# Patient Record
Sex: FEMALE | Race: WHITE | NOT HISPANIC OR LATINO | Employment: PART TIME | URBAN - NONMETROPOLITAN AREA
[De-identification: names, ages, dates, MRNs, and addresses within clinical notes are randomized per-mention and may not be internally consistent; named-entity substitution may affect disease eponyms.]

---
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
## 2022-03-11 NOTE — PROGRESS NOTES
AUDIOMETRIC EVALUATION      Name:  Deborah Barbosa  :  2000  Age:  21 y.o.  Date of Evaluation:  2022       History:  Reason for visit:  Ms. Barbosa is seen today at the request of Casey Matute Jr., MD for a hearing evaluation. Patient was referred to ENT clinic for diminished hearing bilaterally. Patient is here today with her mother. Patient reports having hearing difficulties in both ears. She states this began about two months ago. Patient has not had her hearing tested within the past 5 years ago.     PE Tubes:  yes, both ears, as a child  Other otologic surgical history: yes, both ears, double ear drum repair surgery in 2017  Tinnitus:  yes, both ears  Dizziness:  no  Noise Exposure: yes, hunting (right-handed) wears hearing protection  Aural Fullness:  no, both ears  Otalgia: yes, both ears  Family history of hearing loss: no  Other significant history: high blood pressure, asthma, PCOS       EVALUATION:        RESULTS:    Otoscopic Evaluation:  Right: partially occluding cerumen, tympanic membrane slightly visualized  Left: clear canal, tympanic membrane visualized, possible perforation visualized and scarring on tympanic membrane noted     Tympanometry (226 Hz):  Bilateral: Type B- large ear canal volume    Pure Tone Audiometry:    Right: mild conductive hearing loss rising to normal, with a mild drop at 3kHz  Left: mild conductive hearing loss rising to normal, sloping back to mild     Speech Audiometry:   Right: Speech Reception Threshold (SRT) was obtained at 25 dBHL  Word Recognition scores- excellent or within normal limits (90 - 100%) using NU-6 List 4A, 25 words  Left: Speech Reception Threshold (SRT) was obtained at 20 dBHL  Word Recognition scores- excellent or within normal limits (90 - 100%) using NU-6 List 4A, 25 words    IMPRESSIONS:  Tympanometry showed a large ear canal volume, consistent with a tympanic membrane perforation or a patent PE tube, for both ears. Pure tone  thresholds for both ears show a mild conductive hearing loss, suggesting abnormal outer/middle ear function and normal cochlear/retrocochlear function. Asymmetry present with the right ear significantly worse than the left ear at 3kHz. Patient and mother were counseled with regard to the findings.      Diagnosis:  1. Conductive hearing loss, bilateral    2. Tinnitus of both ears    3. Otalgia of both ears         RECOMMENDATIONS/PLAN:  Follow-up recommendations per Casey Matute Jr., MD    Return for audiologic testing after medical intervention  Use communication strategies  Use hearing protection around loud noises  Avoid silence when possible. Sleep with white noise/fan, or listen to nature sounds      EDUCATION:  Discussed results and recommendations with patient. Questions were addressed and the patient was encouraged to contact our department should concerns arise.        ROSA ELENA Cotto  Licensed Audiologist

## 2022-03-14 ENCOUNTER — OFFICE VISIT (OUTPATIENT)
Dept: OTOLARYNGOLOGY | Facility: CLINIC | Age: 22
End: 2022-03-14

## 2022-03-14 ENCOUNTER — PROCEDURE VISIT (OUTPATIENT)
Dept: OTOLARYNGOLOGY | Facility: CLINIC | Age: 22
End: 2022-03-14

## 2022-03-14 VITALS — HEIGHT: 64 IN | TEMPERATURE: 98.2 F | BODY MASS INDEX: 40.39 KG/M2 | WEIGHT: 236.6 LBS

## 2022-03-14 DIAGNOSIS — H93.13 TINNITUS OF BOTH EARS: ICD-10-CM

## 2022-03-14 DIAGNOSIS — H90.0 CONDUCTIVE HEARING LOSS, BILATERAL: Primary | ICD-10-CM

## 2022-03-14 DIAGNOSIS — H74.03 TYMPANOSCLEROSIS OF BOTH EARS: ICD-10-CM

## 2022-03-14 DIAGNOSIS — H90.6 MIXED CONDUCTIVE AND SENSORINEURAL HEARING LOSS OF BOTH EARS: Primary | ICD-10-CM

## 2022-03-14 DIAGNOSIS — H72.92 MONOMERIC TYMPANIC MEMBRANE OF LEFT EAR: ICD-10-CM

## 2022-03-14 DIAGNOSIS — H92.03 OTALGIA OF BOTH EARS: ICD-10-CM

## 2022-03-14 DIAGNOSIS — H65.493 OTHER CHRONIC NONSUPPURATIVE OTITIS MEDIA OF BOTH EARS: ICD-10-CM

## 2022-03-14 DIAGNOSIS — H61.21 CERUMEN DEBRIS ON TYMPANIC MEMBRANE OF RIGHT EAR: ICD-10-CM

## 2022-03-14 DIAGNOSIS — Z98.890 STATUS POST TYMPANOPLASTY: ICD-10-CM

## 2022-03-14 DIAGNOSIS — H72.91 PERFORATION OF RIGHT TYMPANIC MEMBRANE: ICD-10-CM

## 2022-03-14 PROCEDURE — 99204 OFFICE O/P NEW MOD 45 MIN: CPT | Performed by: OTOLARYNGOLOGY

## 2022-03-14 PROCEDURE — 92567 TYMPANOMETRY: CPT

## 2022-03-14 PROCEDURE — 69210 REMOVE IMPACTED EAR WAX UNI: CPT | Performed by: OTOLARYNGOLOGY

## 2022-03-14 PROCEDURE — 92557 COMPREHENSIVE HEARING TEST: CPT

## 2022-03-14 RX ORDER — CETIRIZINE HYDROCHLORIDE 10 MG/1
10 TABLET ORAL DAILY PRN
COMMUNITY

## 2022-03-14 RX ORDER — LOSARTAN POTASSIUM 50 MG/1
TABLET ORAL DAILY
COMMUNITY
Start: 2022-02-28

## 2022-03-14 RX ORDER — MONTELUKAST SODIUM 10 MG/1
TABLET ORAL DAILY
COMMUNITY
Start: 2022-02-10

## 2022-03-14 RX ORDER — ASCORBIC ACID 250 MG
250 TABLET ORAL DAILY
COMMUNITY
End: 2022-05-10 | Stop reason: ALTCHOICE

## 2022-03-14 RX ORDER — ALBUTEROL SULFATE 90 UG/1
2 AEROSOL, METERED RESPIRATORY (INHALATION) EVERY 4 HOURS PRN
COMMUNITY

## 2022-03-14 NOTE — PATIENT INSTRUCTIONS
PREOPERATIVE SURGERY/PROCEDURE INSTRUCTIONS:  Do not eat or drink ANYTHING after midnight, unless instructed   Clean the operative site by showering with an antibacterial soap (like Dial, Dove, Ivory, etc) and shampooing hair  Preoperative scrub for Surgery:   Skin: Antibacterial soap (Dial, Ivory, Dove) shower daily, including hair.  Be careful not to get into eyes  Do this daily for 5 days  Mouth: Betadine solution 3 times daily for 5 days  Do NOT pluck, shave hair on skin the night prior to operation  If you are diabetic, take your blood sugar the night before and in the morning prior to coming to hospital and give results to nurse and the anesthesiologist    ENT PREOPERATIVE PROTOCOL FOR SURGERY: Begin after COVID test has been performed  After test performed, PLEASE self-quarantine to prevent possible infection!! And start below  Betadine rinses:  Use Betadine rinse in the nose  Spray twice in each nostril 3 times a day beginning 3 days before surgery  Spray nose the morning of surgery, bring with you to the operating room  Use Betadine rinse in the mouth  Gargle, swish and spit 15 ml in mouth and rinse around teeth 3 times daily beginning 3 days prior to surgery  Repeat morning of surgery before arriving at hospital  Betadine rinse can be prescribed at the [facility]    Betadine Iodine mixture Recipe:  Neilmed bottle from pharmacy  Use [employer] Med packet that comes with the bottle  Add Betadine/Povidone 10 ml (2 tsp) to the bottle  Add Melvin baby shampoo 2.5 ml (½ tsp) to the bottle  Fill bottle with distilled water (from grocery store)    DO NOT USE IF IODINE/BETADINE ALLERGIC, OR HISTORY OF THYROID PROBLEMS/THYROID CANCER    Non Betadine rinses:  Nasal rinses:  Use rinse in the nose  Spray twice in each nostril 3 times a day beginning 3 days before surgery  Spray nose the morning of surgery, bring with you to the operating room  Use Same rinse in the mouth  Gargle, swish and spit 15 ml in  mouth and rinse around teeth 3 times daily beginning 3 days prior to surgery  Repeat morning of surgery before arriving at hospital    Nasal mixture Recipe:  Neilmed bottle from pharmacy  Use Juan David Med packet that comes with the bottle  Add Melvin baby shampoo 2.5 ml (½ tsp) to the bottle  Fill bottle with distilled water (from grocery store)    Remove any metallic piercings prior to surgery. You may wear plastic spacers if needed.    Do NOT apply eye makeup Morning of surgery    Please remove fingernail polish prior to surgery    STOP:  -   All natural/homeopathic medications 2 weeks prior to surgery, Ask about over the counter medications  -   Smoking 2 weeks prior to surgery  -   Blood thinners- 3-5 days prior to surgery (or as instructed by doctor)  Bring with you the morning of surgery:  -   Preoperative paperwork  -   Insurance card  -   Identification with photo  -   Home medications or up to date list     Casey Matute Jr, MD has explained the risks, benefits and alternatives to the patient/patient’s representative, in clear and simple language.  Time was allowed for questions.  Risks of procedure include but are not limited to:    As a result of this procedure being performed, the material risks generally recognized are INFECTION, ALLERGIC REACTION, SEVERE LOSS OF BLOOD, LOSS OR LOSS OF FUNCTION OF ANY LIMB OR ORGAN, PARALYSIS OR PARTIAL PARALYSIS, PARAPLEGIA OR QUADRIPLEGIA, DISFIGURING SCAR, BRAIN DAMAGE, CARDIAC ARREST OR DEATH, BLOOD LOSS NECESSITATING TRANSFUSION WHICH CARRIES THE RISK OF EXPOSURE TO AIDS, HEPATITIS OR OTHER INFECTIOUS DISEASES.      Procedure: Tympanoplasty, Ossicular chain reconstruction RIGHT    Risks specific for procedure:   Exam under anesthesia, possible myringoplasty (repair ear drum): The risks and benefits of tympanoplasty were explained including but not limited to bleeding, infection, risks of the general anesthesia, pain, hearing loss, deafness, vertigo, hearing bone  damage, aural fullness, the possibility of a post-auricular approach, possible need for mastoidectomy, persistent perforation, and nerve injury including facial (with facial paralysis) and chorda tympani (with dysguesia) nerves. Alternatives were discussed. The patient/parents demonstrated understanding of these risks. Questions were asked appropriately answered. No guarantees were made or implied.      No guarantees of outcome given or implied  Patient, Mother demonstrate understanding    Patient, Mother do wish to proceed with proposed procedure     WATER PRECAUTIONS FOR EARS    Protecting your ears from water may sometimes be necessary for the health of your ears.     > Ear plugs: You may use earplugs: over the counter silicone plugs or a cotton ball coated with vasoline when bathing. If conservative measures are not working, consider obtaining molded earplugs from the audiology department to use while bathing or swimming.   Purchase inexpensive types that are most comfortable for you. You can make your own by using cotton balls mixed with a generous amount of Vaseline petroleum jelly. Gently place these in the ear canal.    > Dry the ear canal: after getting out of the shower or bath, use a hair dryer on low heat blowing in the ear for 10-15 seconds. Pulling gently backwards on the ear straightens the ear canal and allows the air to get further down.    > If you are to use ear drops, please place them in the ear canal and give them a few seconds to run down.  Follow this by blowing in the ear canal with a hair dryer set on low heat for approximately 10 to 15 seconds.  You may do this multiple times during the day to help keep the ear canal dry.    >If you are swimming frequently- place a drop of oil in each ear canal prior to entering water. After you are finished in the water, place a drop of vinegar in each ear canal. Use a hair dryer on low heat to blow in each ear canal for 10-15 seconds to dry ears out.      Call for ear drainage        CONTACT INFORMATION:  The main office phone number is [phone]. For emergencies after hours and on weekends, this number will convert over to our answering service and the on call provider will answer. Please try to keep non emergent phone calls/ questions to office hours 9am-5pm Monday through Friday.     Major Aide  As an alternative, you can sign up and use the Epic MyChart system for more direct and quicker access for non emergent questions/ problems.  [facility] Major Aide allows you to send messages to your doctor, view your test results, renew your prescriptions, schedule appointments, and more. To sign up, go to [employer] and click on the Sign Up Now link in the New User? box. Enter your Major Aide Activation Code exactly as it appears below along with the last four digits of your Social Security Number and your Date of Birth () to complete the sign-up process. If you do not sign up before the expiration date, you must request a new code.    Major Aide Activation Code: [email or web address]  Expires: 2022  3:00 PM    If you have questions, you can email [email or web address] or call [phone] to talk to our Major Aide staff. Remember, Major Aide is NOT to be used for urgent needs. For medical emergencies, dial 911.

## 2022-03-14 NOTE — PROGRESS NOTES
Casey Matute Jr, MD  [facility]  [address], [address]  [place] [address]  Fax [phone]  Phone [phone]      Visit Type: FOLLOW UP   Chief Complaint   Patient presents with   • Hearing Loss        HPI   Accompanied by: Mother  She complains of hearing loss.  Patient has extensive ear history.  She says she has had tubes.  She has had at least a right tympanoplasty, possible left myringoplasties.  She complains of being unable to hear.  She has ear itching and drainage.  She has been treated for otitis media.  Last evaluated by ENT many years ago.  Hearing-right worse than left  Ringing-none  She has had ear drainge R>>L 2 months ago  She uses Q tips.  Dizziness-none  Smoke none  Drink none    Past Medical History:   Diagnosis Date   • HL (hearing loss)        Past Surgical History:   Procedure Laterality Date   • MYRINGOTOMY W/ TUBES      at 4 years old   • TONSILLECTOMY     • TYMPANOPLASTY         Family History: Her family history is not on file.     Social History: She  has no history on file for tobacco use, alcohol use, and drug use.    Home Medications:  Fluticasone Furoate-Vilanterol, albuterol sulfate HFA, ascorbic acid, cetirizine, losartan, metFORMIN, montelukast, and norethindrone-ethinyl estradiol    Allergies:  She is allergic to latex and oxycodone.       Vital Signs:   Temp:  [98.2 °F (36.8 °C)] 98.2 °F (36.8 °C)  ENT Physical Exam  Constitutional  Appearance: patient appears well-developed and well-nourished,  Communication/Voice: communication appropriate for developmental age; vocal quality normal;  Head and Face  Appearance: head appears normal, face appears normal and face appears atraumatic;  Palpation: facial palpation normal;  Salivary: glands normal;  Ear  Hearing: intact;  Auricles: right auricle normal; left auricle normal;  External Mastoids: left external mastoid normal; External mastoid  comments: Right postauricular incision   Ear Canals: left ear canal normal; Ear Canal comments: Mild right surgical changes  Tympanic Membranes: Tympanic Membrane comments: See procedure note for detail   Nose  External Nose: nares patent bilaterally; external nose normal;  Oral Cavity/Oropharynx  Lips: normal;  Neck  Neck: neck normal;  Respiratory  Inspection: breathing unlabored; normal breathing rate;  Cardiovascular  Inspection: extremities are warm and well perfused; no peripheral edema present;  Neurovestibular  Mental Status: alert and oriented;  Psychiatric: mood normal; affect is appropriate;     Ear Microscopy with Right Cerumen Removal    Date/Time: 3/14/2022 5:04 PM  Performed by: Casey Matute Jr., MD  Authorized by: Casey Matute Jr., MD     Ear examination was performed utilizing binocular microscopy.  Right auricle:   normal:   Right ear canal:   impacted cerumen (On top of tympanic membrane), occluded, scaly.   Right tympanic membrane:   perforated. perforation details: inferior marginal perforation Cannot fully visualize this patient would not allow ceruminous debris to be removed.   Left auricle:   normal:   Left ear canal:   normal:   Left tympanic membrane:   myringosclerosis (Moderate to severe, posterior inferior with monomer, very thin), thickened.     Procedure:    Cerumen was removed from the right ear Patient would not allow removal with a forceps and a suction.   Post-procedure details:     No medication was applied to the ear canal.    The procedure was procedure terminated at patient's request.  Comments:      Right tympanic membrane not fully visualized, suspect collapse with perforation, cerumen in the way  Left tympanic membrane with anterior tympanosclerosis, posterior monomer, retracted area anterior superior with crusting, but does not appear to be perforated  Left side microscopic evaluation  Right side microscopic evaluation with attempted removal of cerumen        Result Review    RESULTS REVIEW    I have reviewed the patients old records in the chart.   I have reviewed the patients old records in the chart.  The following results/records were reviewed:  Audiologic testing reviewed. Mixed hearing loss but low-frequency and high-frequency more pronounced, right slightly worse than left, surprisingly good overall, good discrimination   Referral to Tung Toro MD for Acute suppurative otitis media with spontaneous rupture of ear drum, bilateral (12/09/2021)  REFERRAL/PRE-AUTH CSN - SCAN - REFERRAL (12/14/2021)  EXTERNAL MEDICAL RECORDS - SCAN - EXT MED REC_JUAN CARLOS QIU_12/9/21 (12/09/2021)  Procedure visit with Macarena Heath AUD (03/14/2022)      Assessment/Plan    Diagnoses and all orders for this visit:    1. 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 Tympanosclerosis of both ears  Comments:  Moderate to severe  Left side for tympanosclerosis and monomer  Orders:  -     Comprehensive Hearing Test; Future  -     Case Request; Standing  -     COVID PRE-OP / PRE-PROCEDURE SCREENING ORDER (NO ISOLATION) - Swab, Nasopharynx; Future  -     Case Request    3. Status post tympanoplasty  Comments:  Definitely right side  Possible left side  Orders:  -     Case Request; Standing  -     COVID PRE-OP / PRE-PROCEDURE SCREENING ORDER (NO ISOLATION) - Swab, Nasopharynx; Future  -     Case Request    4. Perforation of right tympanic membrane  -     Ear Microscopy with Right Cerumen Removal    5. Cerumen debris on tympanic membrane of right ear  Comments:  Patient not tolerate removal today    6. Other chronic nonsuppurative otitis media of both ears    7. Monomeric tympanic membrane of left  ear  Comments:  Posterior mostly inferior    Other orders  -     Follow Anesthesia Guidelines / Standing Orders  -     Provide Patient With Instructions on NPO Status       Medical and surgical options were discussed including observation, continued medical management, medication modification, surgical management and CT scanning. Risks, benefits and alternatives were discussed and questions were answered. After considering the options, the patient decided to proceed with surgical management.  Medical and surgical options were discussed including medical and surgical options. Risks, benefits and alternatives were discussed and questions were answered. After considering the options, the patient decided to proceed with surgery.     -----SURGERY SCHEDULING:-----  Schedule MYRINGOPLASTY, Examination under anesthesia (Right)    ---INFORMED CONSENT DISCUSSION:---  MYRINGOPLASTY: The risks and benefits of myringoplasty were explained including but not limited to bleeding, infection, risks of the general anesthesia, hearing loss, vertigo, aural fullness, persistent perforation, and possible need for formal tympanoplasty if the operation fails. Alternatives were discussed. The patient/parents demonstrated understanding of these risks. Questions were asked appropriately answered. No guarantees were made or implied.     ---PREOPERATIVE WORKUP:---  labs/ workup per anesthesia       Patient appears to have bilateral extremely scarred tympanic membranes.  I cannot fully evaluate the right because of tenderness.  The left appears to have no perforation but does appear to have a posterior monomer.  Patient would not allow me to clean the right external auditory canal.  I will take her to the operating room and plan removal of cerumen, possible myringoplasties.  I do not plan to do anything more than this.  I have cautioned the patient that she may have more complex disease then I can ascertain.  I have discussed risk, benefits,  alternative treatments, options.  She wished to proceed with an exam under anesthesia and possible Ringel plasty.  If the patient has more complex disease on the right side, I will plan a CT scan to evaluate the mastoid.  Water precautions  No eardrops  No Q-tips  Plan exam under anesthesia possible right myringoplasty      My Chart:  Encouraged to enroll in My Chart  Encouraged to review data and findings in My Chart    Patient, Mother understand(s) and agree(s) with the treatment plan as described.    Return RTC 2 weeks after surgery, for Recheck Ears.      Casey Matute Jr, MD  03/14/22  17:08 CDT

## 2022-03-15 PROBLEM — H90.6 MIXED CONDUCTIVE AND SENSORINEURAL HEARING LOSS OF BOTH EARS: Status: ACTIVE | Noted: 2022-03-15

## 2022-03-15 PROBLEM — Z98.890 STATUS POST TYMPANOPLASTY: Status: ACTIVE | Noted: 2022-03-15

## 2022-03-15 PROBLEM — H74.03 TYMPANOSCLEROSIS OF BOTH EARS: Status: ACTIVE | Noted: 2022-03-15

## 2022-05-10 ENCOUNTER — LAB (OUTPATIENT)
Dept: LAB | Facility: HOSPITAL | Age: 22
End: 2022-05-10

## 2022-05-10 ENCOUNTER — PRE-ADMISSION TESTING (OUTPATIENT)
Dept: PREADMISSION TESTING | Facility: HOSPITAL | Age: 22
End: 2022-05-10

## 2022-05-10 VITALS
DIASTOLIC BLOOD PRESSURE: 100 MMHG | RESPIRATION RATE: 18 BRPM | WEIGHT: 235.89 LBS | SYSTOLIC BLOOD PRESSURE: 154 MMHG | HEIGHT: 63 IN | OXYGEN SATURATION: 99 % | HEART RATE: 97 BPM | BODY MASS INDEX: 41.8 KG/M2

## 2022-05-10 DIAGNOSIS — Z98.890 STATUS POST TYMPANOPLASTY: ICD-10-CM

## 2022-05-10 DIAGNOSIS — H90.6 MIXED CONDUCTIVE AND SENSORINEURAL HEARING LOSS OF BOTH EARS: ICD-10-CM

## 2022-05-10 DIAGNOSIS — H74.03 TYMPANOSCLEROSIS OF BOTH EARS: ICD-10-CM

## 2022-05-10 LAB
ANION GAP SERPL CALCULATED.3IONS-SCNC: 13 MMOL/L (ref 5–15)
BUN SERPL-MCNC: 9 MG/DL (ref 6–20)
BUN/CREAT SERPL: 14.1 (ref 7–25)
CALCIUM SPEC-SCNC: 9.5 MG/DL (ref 8.6–10.5)
CHLORIDE SERPL-SCNC: 104 MMOL/L (ref 98–107)
CO2 SERPL-SCNC: 23 MMOL/L (ref 22–29)
CREAT SERPL-MCNC: 0.64 MG/DL (ref 0.57–1)
DEPRECATED RDW RBC AUTO: 43.3 FL (ref 37–54)
EGFRCR SERPLBLD CKD-EPI 2021: 130.7 ML/MIN/1.73
ERYTHROCYTE [DISTWIDTH] IN BLOOD BY AUTOMATED COUNT: 14.2 % (ref 12.3–15.4)
GLUCOSE SERPL-MCNC: 123 MG/DL (ref 65–99)
HCT VFR BLD AUTO: 41.1 % (ref 34–46.6)
HGB BLD-MCNC: 13.5 G/DL (ref 12–15.9)
MCH RBC QN AUTO: 27.7 PG (ref 26.6–33)
MCHC RBC AUTO-ENTMCNC: 32.8 G/DL (ref 31.5–35.7)
MCV RBC AUTO: 84.4 FL (ref 79–97)
PLATELET # BLD AUTO: 450 10*3/MM3 (ref 140–450)
PMV BLD AUTO: 9.7 FL (ref 6–12)
POTASSIUM SERPL-SCNC: 4.1 MMOL/L (ref 3.5–5.2)
RBC # BLD AUTO: 4.87 10*6/MM3 (ref 3.77–5.28)
SARS-COV-2 ORF1AB RESP QL NAA+PROBE: NOT DETECTED
SODIUM SERPL-SCNC: 140 MMOL/L (ref 136–145)
WBC NRBC COR # BLD: 9.94 10*3/MM3 (ref 3.4–10.8)

## 2022-05-10 PROCEDURE — 85027 COMPLETE CBC AUTOMATED: CPT

## 2022-05-10 PROCEDURE — 36415 COLL VENOUS BLD VENIPUNCTURE: CPT

## 2022-05-10 PROCEDURE — U0004 COV-19 TEST NON-CDC HGH THRU: HCPCS

## 2022-05-10 PROCEDURE — 93010 ELECTROCARDIOGRAM REPORT: CPT | Performed by: INTERNAL MEDICINE

## 2022-05-10 PROCEDURE — C9803 HOPD COVID-19 SPEC COLLECT: HCPCS

## 2022-05-10 PROCEDURE — 93005 ELECTROCARDIOGRAM TRACING: CPT

## 2022-05-10 PROCEDURE — 80048 BASIC METABOLIC PNL TOTAL CA: CPT

## 2022-05-10 RX ORDER — ESCITALOPRAM OXALATE 5 MG/1
5 TABLET ORAL DAILY
COMMUNITY

## 2022-05-10 RX ORDER — MULTIPLE VITAMINS W/ MINERALS TAB 9MG-400MCG
1 TAB ORAL DAILY
COMMUNITY

## 2022-05-10 NOTE — DISCHARGE INSTRUCTIONS
Before you come to the hospital        Arrival time: AS DIRECTED BY OFFICE     YOU MAY TAKE THE FOLLOWING MEDICATION(S) THE MORNING OF SURGERY WITH A SIP OF WATER: ***           DO NOT TAKE YOUR LOSARTAN 24 HOURS PRIOR TO SURGERY          ALL OTHER HOME MEDICATION CHECK WITH YOUR PHYSICIAN (especially if you are taking diabetes medicines or blood thinners)    Do not take any Erectile Dysfunction medications (EX: CIALIS, VIAGRA) 24 hours prior to surgery      If you were given and instructed to use a germ- killing soap, use as directed the night before surgery and the morning of surgery before coming to the hospital.       Eating and drinking restrictions  (It is extremely important that you follow these guidelines to prevent delay or cancelation of your procedure)   Up to 2 hours prior to the time you are told to arrive to the hospital - you may continue to drink clear liquids, such as water, clear fruit juice (no pulp), black coffee, and plain tea.          Eating and drinking restrictions prior to scheduled arrival time    2 Hours before arrival time STOP   Drinking Clear liquids (water, apple juice-no pulp)     6 Hours before arrival time STOP   Milk or drinks that contain milk, full liquids    6 Hours before arrival time STOP   Light meals or foods, such as toast or cereal    8 Hours before arrival time STOP   Heavy foods, such as meat, fried foods, or fatty foods      Clear Liquids  Water and flavored water                                                                      Clear Fruit juices, such as cranberry juice and apple juice.  Black coffee (NO cream of any kind, including powdered).  Plain tea  Clear bouillon or broth.  Flavored gelatin.  Soda.  Gatorade or Powerade.  Full liquid examples  Juices that have pulp.  Frozen ice pops that contain fruit pieces.  Coffee with creamer  Milk.  Yogurt.              MANAGING PAIN AFTER SURGERY    We know you are probably wondering what your pain will be like after  surgery.  Following surgery it is unrealistic to expect you will not have pain.   Pain is how our bodies let us know that something is wrong or cautions us to be careful.  That said, our goal is to make your pain tolerable.    Methods we may use to treat your pain include (oral or IV medications, PCAs, epidurals, nerve blocks, etc.)   While some procedures require IV pain medications for a short time after surgery, transitioning to pain medications by mouth allows for better management of pain.   Your nurse will encourage you to take oral pain medications whenever possible.  IV medications work almost immediately, but only last a short while.  Taking medications by mouth allows for a more constant level of medication in your blood stream for a longer period of time.      Once your pain is out of control it is harder to get back under control.  It is important you are aware when your next dose of pain medication is due.  If you are admitted, your nurse may write the time of your next dose on the white board in your room to help you remember.      We are interested in your pain and encourage you to inform us about aggravating factors during your visit.   Many times a simple repositioning every few hours can make a big difference.    If your physician says it is okay, do not let your pain prevent you from getting out of bed. Be sure to call your nurse for assistance prior to getting up so you do not fall.      Before surgery, please decide your tolerable pain goal.  These faces help describe the pain ratings we use on a 0-10 scale.   Be prepared to tell us your goal and whether or not you take pain or anxiety medications at home.

## 2022-05-11 LAB
QT INTERVAL: 360 MS
QTC INTERVAL: 433 MS

## 2022-05-12 ENCOUNTER — ANESTHESIA EVENT (OUTPATIENT)
Dept: PERIOP | Facility: HOSPITAL | Age: 22
End: 2022-05-12

## 2022-05-13 ENCOUNTER — ANESTHESIA (OUTPATIENT)
Dept: PERIOP | Facility: HOSPITAL | Age: 22
End: 2022-05-13

## 2022-05-13 ENCOUNTER — HOSPITAL ENCOUNTER (OUTPATIENT)
Facility: HOSPITAL | Age: 22
Setting detail: HOSPITAL OUTPATIENT SURGERY
Discharge: HOME | End: 2022-05-13
Attending: OTOLARYNGOLOGY | Admitting: OTOLARYNGOLOGY

## 2022-05-13 ENCOUNTER — TELEPHONE (OUTPATIENT)
Dept: OTOLARYNGOLOGY | Facility: CLINIC | Age: 22
End: 2022-05-13

## 2022-05-13 VITALS
DIASTOLIC BLOOD PRESSURE: 75 MMHG | RESPIRATION RATE: 16 BRPM | SYSTOLIC BLOOD PRESSURE: 120 MMHG | TEMPERATURE: 97.2 F | HEART RATE: 89 BPM | OXYGEN SATURATION: 97 %

## 2022-05-13 PROBLEM — Z98.890 S/P TYMPANOPLASTY: Status: ACTIVE | Noted: 2022-05-13

## 2022-05-13 LAB — B-HCG UR QL: NEGATIVE

## 2022-05-13 PROCEDURE — 25010000002 DEXAMETHASONE PER 1 MG: Performed by: ANESTHESIOLOGY

## 2022-05-13 PROCEDURE — 25010000002 DEXAMETHASONE PER 1 MG: Performed by: NURSE ANESTHETIST, CERTIFIED REGISTERED

## 2022-05-13 PROCEDURE — 25010000002 EPINEPHRINE PER 0.1 MG: Performed by: OTOLARYNGOLOGY

## 2022-05-13 PROCEDURE — 25010000002 ONDANSETRON PER 1 MG: Performed by: NURSE ANESTHETIST, CERTIFIED REGISTERED

## 2022-05-13 PROCEDURE — 25010000002 PROPOFOL 10 MG/ML EMULSION: Performed by: NURSE ANESTHETIST, CERTIFIED REGISTERED

## 2022-05-13 PROCEDURE — 81025 URINE PREGNANCY TEST: CPT | Performed by: OTOLARYNGOLOGY

## 2022-05-13 PROCEDURE — 69620 MYRINGOPLASTY: CPT | Performed by: OTOLARYNGOLOGY

## 2022-05-13 PROCEDURE — C1763 CONN TISS, NON-HUMAN: HCPCS | Performed by: OTOLARYNGOLOGY

## 2022-05-13 PROCEDURE — 25010000002 MIDAZOLAM PER 1 MG: Performed by: ANESTHESIOLOGY

## 2022-05-13 DEVICE — HEMO ABS GELFOAM SPNG PORCN SZ12TO7: Type: IMPLANTABLE DEVICE | Site: EAR | Status: FUNCTIONAL

## 2022-05-13 DEVICE — PTCH OTOLOGIC EPIFILM LAMINA: Type: IMPLANTABLE DEVICE | Site: EAR | Status: FUNCTIONAL

## 2022-05-13 RX ORDER — EPHEDRINE SULFATE 50 MG/ML
INJECTION, SOLUTION INTRAVENOUS AS NEEDED
Status: DISCONTINUED | OUTPATIENT
Start: 2022-05-13 | End: 2022-05-13 | Stop reason: SURG

## 2022-05-13 RX ORDER — LIDOCAINE HYDROCHLORIDE 10 MG/ML
0.5 INJECTION, SOLUTION EPIDURAL; INFILTRATION; INTRACAUDAL; PERINEURAL ONCE AS NEEDED
Status: DISCONTINUED | OUTPATIENT
Start: 2022-05-13 | End: 2022-05-13 | Stop reason: HOSPADM

## 2022-05-13 RX ORDER — MIDAZOLAM HYDROCHLORIDE 1 MG/ML
2 INJECTION INTRAMUSCULAR; INTRAVENOUS
Status: DISCONTINUED | OUTPATIENT
Start: 2022-05-13 | End: 2022-05-13 | Stop reason: HOSPADM

## 2022-05-13 RX ORDER — FLUMAZENIL 0.1 MG/ML
0.2 INJECTION INTRAVENOUS AS NEEDED
Status: DISCONTINUED | OUTPATIENT
Start: 2022-05-13 | End: 2022-05-13 | Stop reason: HOSPADM

## 2022-05-13 RX ORDER — DEXAMETHASONE SODIUM PHOSPHATE 4 MG/ML
INJECTION, SOLUTION INTRA-ARTICULAR; INTRALESIONAL; INTRAMUSCULAR; INTRAVENOUS; SOFT TISSUE AS NEEDED
Status: DISCONTINUED | OUTPATIENT
Start: 2022-05-13 | End: 2022-05-13 | Stop reason: SURG

## 2022-05-13 RX ORDER — HYDROCODONE BITARTRATE AND ACETAMINOPHEN 10; 325 MG/1; MG/1
1 TABLET ORAL ONCE AS NEEDED
Status: DISCONTINUED | OUTPATIENT
Start: 2022-05-13 | End: 2022-05-13 | Stop reason: HOSPADM

## 2022-05-13 RX ORDER — ONDANSETRON 2 MG/ML
4 INJECTION INTRAMUSCULAR; INTRAVENOUS ONCE AS NEEDED
Status: DISCONTINUED | OUTPATIENT
Start: 2022-05-13 | End: 2022-05-13 | Stop reason: HOSPADM

## 2022-05-13 RX ORDER — FENTANYL CITRATE 50 UG/ML
25 INJECTION, SOLUTION INTRAMUSCULAR; INTRAVENOUS
Status: DISCONTINUED | OUTPATIENT
Start: 2022-05-13 | End: 2022-05-13 | Stop reason: HOSPADM

## 2022-05-13 RX ORDER — LIDOCAINE HYDROCHLORIDE 20 MG/ML
INJECTION, SOLUTION EPIDURAL; INFILTRATION; INTRACAUDAL; PERINEURAL AS NEEDED
Status: DISCONTINUED | OUTPATIENT
Start: 2022-05-13 | End: 2022-05-13 | Stop reason: SURG

## 2022-05-13 RX ORDER — PHENYLEPHRINE HCL IN 0.9% NACL 1 MG/10 ML
SYRINGE (ML) INTRAVENOUS AS NEEDED
Status: DISCONTINUED | OUTPATIENT
Start: 2022-05-13 | End: 2022-05-13 | Stop reason: SURG

## 2022-05-13 RX ORDER — SODIUM CHLORIDE 0.9 % (FLUSH) 0.9 %
3 SYRINGE (ML) INJECTION AS NEEDED
Status: DISCONTINUED | OUTPATIENT
Start: 2022-05-13 | End: 2022-05-13 | Stop reason: HOSPADM

## 2022-05-13 RX ORDER — OXYCODONE AND ACETAMINOPHEN 7.5; 325 MG/1; MG/1
2 TABLET ORAL EVERY 4 HOURS PRN
Status: CANCELLED | OUTPATIENT
Start: 2022-05-13 | End: 2022-05-23

## 2022-05-13 RX ORDER — SODIUM CHLORIDE 0.9 % (FLUSH) 0.9 %
3 SYRINGE (ML) INJECTION EVERY 12 HOURS SCHEDULED
Status: DISCONTINUED | OUTPATIENT
Start: 2022-05-13 | End: 2022-05-13 | Stop reason: HOSPADM

## 2022-05-13 RX ORDER — SODIUM CHLORIDE 0.9 % (FLUSH) 0.9 %
3-10 SYRINGE (ML) INJECTION AS NEEDED
Status: DISCONTINUED | OUTPATIENT
Start: 2022-05-13 | End: 2022-05-13 | Stop reason: HOSPADM

## 2022-05-13 RX ORDER — SCOLOPAMINE TRANSDERMAL SYSTEM 1 MG/1
1 PATCH, EXTENDED RELEASE TRANSDERMAL
Status: DISCONTINUED | OUTPATIENT
Start: 2022-05-13 | End: 2022-05-13 | Stop reason: HOSPADM

## 2022-05-13 RX ORDER — SODIUM CHLORIDE, SODIUM LACTATE, POTASSIUM CHLORIDE, CALCIUM CHLORIDE 600; 310; 30; 20 MG/100ML; MG/100ML; MG/100ML; MG/100ML
1000 INJECTION, SOLUTION INTRAVENOUS CONTINUOUS
Status: DISCONTINUED | OUTPATIENT
Start: 2022-05-13 | End: 2022-05-13 | Stop reason: HOSPADM

## 2022-05-13 RX ORDER — ONDANSETRON 2 MG/ML
INJECTION INTRAMUSCULAR; INTRAVENOUS AS NEEDED
Status: DISCONTINUED | OUTPATIENT
Start: 2022-05-13 | End: 2022-05-13 | Stop reason: SURG

## 2022-05-13 RX ORDER — LABETALOL HYDROCHLORIDE 5 MG/ML
5 INJECTION, SOLUTION INTRAVENOUS
Status: DISCONTINUED | OUTPATIENT
Start: 2022-05-13 | End: 2022-05-13 | Stop reason: HOSPADM

## 2022-05-13 RX ORDER — PROPOFOL 10 MG/ML
VIAL (ML) INTRAVENOUS AS NEEDED
Status: DISCONTINUED | OUTPATIENT
Start: 2022-05-13 | End: 2022-05-13 | Stop reason: SURG

## 2022-05-13 RX ORDER — DROPERIDOL 2.5 MG/ML
0.62 INJECTION, SOLUTION INTRAMUSCULAR; INTRAVENOUS ONCE AS NEEDED
Status: DISCONTINUED | OUTPATIENT
Start: 2022-05-13 | End: 2022-05-13 | Stop reason: HOSPADM

## 2022-05-13 RX ORDER — DEXAMETHASONE SODIUM PHOSPHATE 4 MG/ML
4 INJECTION, SOLUTION INTRA-ARTICULAR; INTRALESIONAL; INTRAMUSCULAR; INTRAVENOUS; SOFT TISSUE ONCE AS NEEDED
Status: COMPLETED | OUTPATIENT
Start: 2022-05-13 | End: 2022-05-13

## 2022-05-13 RX ORDER — EPINEPHRINE 1 MG/ML
INJECTION, SOLUTION, CONCENTRATE INTRAVENOUS AS NEEDED
Status: DISCONTINUED | OUTPATIENT
Start: 2022-05-13 | End: 2022-05-13 | Stop reason: HOSPADM

## 2022-05-13 RX ORDER — NALOXONE HCL 0.4 MG/ML
0.4 VIAL (ML) INJECTION AS NEEDED
Status: DISCONTINUED | OUTPATIENT
Start: 2022-05-13 | End: 2022-05-13 | Stop reason: HOSPADM

## 2022-05-13 RX ORDER — OXYCODONE AND ACETAMINOPHEN 10; 325 MG/1; MG/1
1 TABLET ORAL ONCE AS NEEDED
Status: CANCELLED | OUTPATIENT
Start: 2022-05-13

## 2022-05-13 RX ORDER — ACETAMINOPHEN 500 MG
1000 TABLET ORAL ONCE
Status: COMPLETED | OUTPATIENT
Start: 2022-05-13 | End: 2022-05-13

## 2022-05-13 RX ORDER — SODIUM CHLORIDE, SODIUM LACTATE, POTASSIUM CHLORIDE, CALCIUM CHLORIDE 600; 310; 30; 20 MG/100ML; MG/100ML; MG/100ML; MG/100ML
100 INJECTION, SOLUTION INTRAVENOUS CONTINUOUS
Status: DISCONTINUED | OUTPATIENT
Start: 2022-05-13 | End: 2022-05-13 | Stop reason: HOSPADM

## 2022-05-13 RX ADMIN — LIDOCAINE HYDROCHLORIDE 100 MG: 20 INJECTION, SOLUTION EPIDURAL; INFILTRATION; INTRACAUDAL; PERINEURAL at 07:53

## 2022-05-13 RX ADMIN — EPHEDRINE SULFATE 10 MG: 50 INJECTION INTRAVENOUS at 08:16

## 2022-05-13 RX ADMIN — PROPOFOL 200 MG: 10 INJECTION, EMULSION INTRAVENOUS at 07:53

## 2022-05-13 RX ADMIN — EPHEDRINE SULFATE 15 MG: 50 INJECTION INTRAVENOUS at 08:14

## 2022-05-13 RX ADMIN — ACETAMINOPHEN 1000 MG: 500 TABLET ORAL at 07:27

## 2022-05-13 RX ADMIN — DEXAMETHASONE SODIUM PHOSPHATE 4 MG: 4 INJECTION, SOLUTION INTRAMUSCULAR; INTRAVENOUS at 07:28

## 2022-05-13 RX ADMIN — EPHEDRINE SULFATE 15 MG: 50 INJECTION INTRAVENOUS at 08:23

## 2022-05-13 RX ADMIN — DEXAMETHASONE SODIUM PHOSPHATE 8 MG: 4 INJECTION, SOLUTION INTRA-ARTICULAR; INTRALESIONAL; INTRAMUSCULAR; INTRAVENOUS; SOFT TISSUE at 07:56

## 2022-05-13 RX ADMIN — Medication 250 MCG: at 08:04

## 2022-05-13 RX ADMIN — ONDANSETRON 4 MG: 2 INJECTION INTRAMUSCULAR; INTRAVENOUS at 08:00

## 2022-05-13 RX ADMIN — Medication 150 MCG: at 08:07

## 2022-05-13 RX ADMIN — Medication 50 MCG: at 08:00

## 2022-05-13 RX ADMIN — MIDAZOLAM 2 MG: 1 INJECTION INTRAMUSCULAR; INTRAVENOUS at 07:46

## 2022-05-13 RX ADMIN — SODIUM CHLORIDE, POTASSIUM CHLORIDE, SODIUM LACTATE AND CALCIUM CHLORIDE: 600; 310; 30; 20 INJECTION, SOLUTION INTRAVENOUS at 08:21

## 2022-05-13 RX ADMIN — SODIUM CHLORIDE, POTASSIUM CHLORIDE, SODIUM LACTATE AND CALCIUM CHLORIDE 1000 ML: 600; 310; 30; 20 INJECTION, SOLUTION INTRAVENOUS at 06:11

## 2022-05-13 RX ADMIN — Medication 100 MCG: at 08:02

## 2022-05-13 RX ADMIN — SCOPALAMINE 1 PATCH: 1 PATCH, EXTENDED RELEASE TRANSDERMAL at 07:27

## 2022-05-13 NOTE — ANESTHESIA PREPROCEDURE EVALUATION
Anesthesia Evaluation     Patient summary reviewed   no history of anesthetic complications:  NPO Solid Status: > 8 hours  NPO Liquid Status: > 8 hours           Airway   Mallampati: I  TM distance: >3 FB  Neck ROM: full  Dental - normal exam     Pulmonary    (+) asthma,  (-) sleep apnea, not a smoker  Cardiovascular   Exercise tolerance: excellent (>7 METS)    ECG reviewed    (+) hypertension,       Neuro/Psych- negative ROS  GI/Hepatic/Renal/Endo    (+) morbid obesity,    (-) liver disease, no renal disease, diabetes    Musculoskeletal     Abdominal    Substance History      OB/GYN          Other                        Anesthesia Plan    ASA 3     general     intravenous induction     Anesthetic plan, all risks, benefits, and alternatives have been provided, discussed and informed consent has been obtained with: patient.        CODE STATUS:

## 2022-05-13 NOTE — ANESTHESIA PROCEDURE NOTES
Airway  Urgency: elective    Date/Time: 5/13/2022 7:53 AM  Airway not difficult    General Information and Staff    Patient location during procedure: OR  CRNA/CAA: Christos Ly CRNA    Indications and Patient Condition  Indications for airway management: airway protection    Preoxygenated: yes  Mask difficulty assessment: 0 - not attempted    Final Airway Details  Final airway type: supraglottic airway      Successful airway: unique  Size 3    Number of attempts at approach: 1  Assessment: lips, teeth, and gum same as pre-op

## 2022-05-13 NOTE — TELEPHONE ENCOUNTER
Provider: DR OAKLEY  Caller: NORRIS GALEANA  Relationship to Patient: MOTHER    Phone Number: [phone]  Reason for Call: PT'S MOM CALLED TO CK ON POST-OP APPT DATE/TIME. THE APPT IS SCHEDULED FOR 6-6-22 @11:30AM. PT LIVES 1.5 HRS AWAY, MOM IS ASKING IF THERE ARE ANY APPTS THAT WEEK @2PM OR AFTER, THAT WAY SHE WOULDN'T HAVE TO TAKE A WHOLE VACATION DAY FOR THE APPT. PLEASE GIVE PT A CALL BACK TO DISCUSS IF THAT IS POSSIBLE

## 2022-05-13 NOTE — ANESTHESIA POSTPROCEDURE EVALUATION
Patient: Deborah Barbosa    Procedure Summary     Date: 05/13/22 Room / Location:  [facility] OR  /  [facility] OR    Anesthesia Start: 0750 Anesthesia Stop: 0837    Procedure: MYRINGOPLASTY, Examination under anesthesia (Right Ear) Diagnosis:       Mixed conductive and sensorineural hearing loss of both ears      Tympanosclerosis of both ears      Status post tympanoplasty      (Mixed conductive and sensorineural hearing loss of both ears [H90.6])      (Tympanosclerosis of both ears [H74.03])      (Status post tympanoplasty [Z98.890])    Surgeons: Casey Matute Jr., MD Provider: Christos Ly CRNA    Anesthesia Type: general ASA Status: 3          Anesthesia Type: general    Vitals  Vitals Value Taken Time   /75 05/13/22 0856   Temp 97.2 °F (36.2 °C) 05/13/22 0855   Pulse 87 05/13/22 0904   Resp 15 05/13/22 0855   SpO2 91 % 05/13/22 0904   Vitals shown include unvalidated device data.        Post Anesthesia Care and Evaluation    Patient location during evaluation: PACU  Patient participation: complete - patient participated  Level of consciousness: awake and alert  Pain management: adequate  Airway patency: patent  Anesthetic complications: No anesthetic complications    Cardiovascular status: acceptable  Respiratory status: acceptable  Hydration status: acceptable    Comments: Blood pressure 136/60, pulse 96, temperature 97.2 °F (36.2 °C), temperature source Temporal, resp. rate 16, last menstrual period 05/11/2022, SpO2 96 %, not currently breastfeeding.    Pt discharged from PACU based on fani score >8

## 2022-05-13 NOTE — H&P
[facility]   PREOPERATIVE HISTORY AND PHYSICAL    Patient Name:Deborah Barbosa  : 2000  MRN: [id number]  Primary Care Physician: Nayely Randolph APRN  Date of admission: 2022    Subjective   Subjective     Chief Complaint: preoperative evaluation    History of Present Illness  Deborah Barbosa is a 21 y.o. female who presents for preoperative evaluation. She is scheduled for MYRINGOPLASTY, Examination under anesthesia (Right)    Personal History     Past Medical History:   Diagnosis Date   • Anxiety    • Asthma    • HL (hearing loss)    • PCOS (polycystic ovarian syndrome)        Past Surgical History:   Procedure Laterality Date   • ADENOIDECTOMY     • HAND SURGERY Right    • MYRINGOTOMY W/ TUBES      at 4 years old   • TONSILLECTOMY     • TYMPANOPLASTY         Family History: Her family history is not on file.     Social History: She  reports that she has never smoked. She has never used smokeless tobacco. She reports that she does not drink alcohol and does not use drugs.    Home Medications:  Fluticasone Furoate-Vilanterol, albuterol sulfate HFA, cetirizine, escitalopram, losartan, metFORMIN, montelukast, multivitamin with minerals, and norethindrone-ethinyl estradiol    Allergies:  She is allergic to betadine [povidone-iodine], latex, and oxycodone.    Objective    Objective     Vitals:    Temp:  [97.5 °F (36.4 °C)] 97.5 °F (36.4 °C)  Heart Rate:  [67-97] 67  Resp:  [16] 16  BP: (125)/(100) 125/100    ENT Physical Exam  Constitutional  Appearance: patient appears well-developed and well-nourished,  Communication/Voice: communication appropriate for developmental age; vocal quality normal;  Head and Face  Appearance: head appears normal, face appears normal and face appears atraumatic;  Palpation: facial palpation normal;  Salivary: glands normal;  Ear  Hearing: intact;  Auricles: right auricle normal; left auricle normal;  External Mastoids: left external mastoid normal; External mastoid  comments: Right postauricular incision   Ear Canals: left ear canal normal; Ear Canal comments: Mild right surgical changes  Tympanic Membranes: Tympanic Membrane comments: See procedure note for detail   Nose  External Nose: nares patent bilaterally; external nose normal;  Oral Cavity/Oropharynx  Lips: normal;  Neck  Neck: neck normal;  Respiratory  Inspection: breathing unlabored; normal breathing rate;  Cardiovascular  Inspection: extremities are warm and well perfused; no peripheral edema present;  Neurovestibular  Mental Status: alert and oriented;  Psychiatric: mood normal; affect is appropriate;        Assessment & Plan   Assessment / Plan     Brief Patient Summary:  Deborah Barbosa is a 21 y.o. female who presents for preoperative evaluation.    Pre-Op Diagnosis Codes:     * Mixed conductive and sensorineural hearing loss of both ears [H90.6]     * Tympanosclerosis of both ears [H74.03]     * Status post tympanoplasty [Z98.890]    Active Hospital Problems:  Active Hospital Problems    Diagnosis    • Mixed conductive and sensorineural hearing loss of both ears      Added automatically from request for surgery [id number]     • Tympanosclerosis of both ears      Added automatically from request for surgery [id number]     • Status post tympanoplasty      Added automatically from request for surgery [id number]       Plan:   Procedure(s):  MYRINGOPLASTY, Examination under anesthesia    MYRINGOTOMY TUBE REMOVAL: The risks, benefits, and alternatives of myringotomy tube removal including but not limited to pain, bleeding, infection, tympanic membrane perforation, possible need for further medical and or surgical treatment, and risks of the anesthesia were discussed full with the patient/ parents and questions were answered. No guarantees were made or implied.    MYRINGOPLASTY: The risks and benefits of myringoplasty were explained including but not limited to bleeding, infection, risks of the general anesthesia, hearing loss,  vertigo, aural fullness, persistent perforation, and possible need for formal tympanoplasty if the operation fails. Alternatives were discussed. The patient/parents demonstrated understanding of these risks. Questions were asked appropriately answered. No guarantees were made or implied.     Casey Matute Jr, MD   Electronically signed by Casey Matute Jr, MD, 05/13/22, 7:49 AM CDT.

## 2022-05-13 NOTE — OP NOTE
[facility] Otolaryngology Head and Neck Surgery  OPERATIVE NOTE  Deborah Barbosa  5/13/2022    Pre-op Diagnosis:   Mixed conductive and sensorineural hearing loss of both ears [H90.6]  Tympanosclerosis of both ears [H74.03]  Status post tympanoplasty [Z98.890]    Post-op Diagnosis:     Post-Op Diagnosis Codes:     * Mixed conductive and sensorineural hearing loss of both ears [H90.6]     * Tympanosclerosis of both ears [H74.03]     * Status post tympanoplasty [Z98.890]    Procedure/CPT® Codes:  Procedure(s):  MYRINGOPLASTY, Examination under anesthesia    Surgeon(s):  Casey Matute Jr., MD    Anesthesia:    General    Staff:   Circulator: Lemuel Vital RN; Boby Gupta RN  Scrub Person: Catalina Patterson; Neville Mir    Estimated Blood Loss:   Minimal    Specimens:   none      Drains:   none    Findings:  Right tympanic membrane-85% perforation of the tympanic membrane with the posterior inferior quadrant being marginal  Malleus-most of the long process has been eroded, with a short process present, medialized  Incudostapedial joint visualized with significant adhesions all around the joint itself in all directions  Chorda tympani appears present  Scarring over the promontory and around the round window but round window visualized  Oval window not visualized  No squamous debris in the middle ear    Complications:   none    Assistants:  none    Implants:  EpiFilm, Gelfoam    Time Out:: A time out was performed to confirm the patient, procedure and laterality.    Reason for the Operation: Deborah Barbosa is a 21 y.o. female who has had a history of bilateral tympanoplasty with persistent perforation on the right side.  Preoperative discussion was carried out. Risks, benefits, options for therapy and complications were explained in clear and simple language.      Procedure Description:  The patient was taken back to the operating room, positioned on the operating table and placed under  satisfactory anesthesia by the anesthesia staff.      SIDE: Right  Ear prep: Ear canal was irrigated with saline  The ear canal was irrigated clean and dry    Approach:    Transcanal    Procedure detail:  RIGHT: Tympanic membrane   Middle ear exploration: The middle ear was visualized through the perforation.    Myringoplasty: The rim of the perforation was removed with sharp dissection.  The middle ear was examined, the malleus long process was mostly eroded, with a very retracted remaining malleus.  REPAIR: EpiFilm was trimmed to fit the area was trimmed and then inserted onto the lateral surface of the tympanic membrane.  The graft spanned the perforation.  This was packed into position with Gelfoam.  Closure:  Gelfoam was placed over the lateral tympanic membrane surface    A cotton ball was placed in the ear     At the end of the procedure, the operative site was found to be hemostatic.  The operative site was inspected for retained foreign bodies and instruments.   Sponge/needle count was Correct  Hemostasis was satisfactory.  The patient was then turned over to the anesthesia team and allowed to wake from anesthesia.     Disposition: The patient was transported to the PACU in Good condition.   Patient will be discharged home following procedure.    Postoperative discussion held with: Mother  Procedure and findings reviewed.  DVT ASSESSMENT CARRIED OUT: Patient is in the immediate post-operative period and is not a candidate for anticoagulation therapy    Casey Matute Jr, MD  5/13/2022  08:26 CDT

## 2022-05-18 ENCOUNTER — TELEPHONE (OUTPATIENT)
Dept: OTOLARYNGOLOGY | Facility: CLINIC | Age: 22
End: 2022-05-18

## 2022-05-18 DIAGNOSIS — Z98.890 S/P TYMPANOPLASTY: ICD-10-CM

## 2022-05-18 DIAGNOSIS — Z98.890 STATUS POST TYMPANOPLASTY: ICD-10-CM

## 2022-05-18 DIAGNOSIS — H74.03 TYMPANOSCLEROSIS OF BOTH EARS: Primary | ICD-10-CM

## 2022-05-18 RX ORDER — PREDNISOLONE ACETATE 10 MG/ML
2 SUSPENSION/ DROPS OPHTHALMIC 2 TIMES DAILY
Qty: 2 ML | Refills: 0 | Status: SHIPPED | OUTPATIENT
Start: 2022-05-18 | End: 2022-05-25

## 2022-05-18 NOTE — TELEPHONE ENCOUNTER
I spoke with the patient. She left a voicemail and was concerned about drainage from ear and some blood. I told her that was normal after surgery. If the drainage and blood increases she should contact our office. Dr Matute is calling her in some ear drops.

## 2022-06-06 ENCOUNTER — OFFICE VISIT (OUTPATIENT)
Dept: OTOLARYNGOLOGY | Facility: CLINIC | Age: 22
End: 2022-06-06

## 2022-06-06 VITALS
WEIGHT: 238.6 LBS | DIASTOLIC BLOOD PRESSURE: 92 MMHG | SYSTOLIC BLOOD PRESSURE: 147 MMHG | HEIGHT: 64 IN | BODY MASS INDEX: 40.74 KG/M2 | TEMPERATURE: 97.8 F | HEART RATE: 88 BPM

## 2022-06-06 DIAGNOSIS — H90.0 CONDUCTIVE HEARING LOSS, BILATERAL: ICD-10-CM

## 2022-06-06 DIAGNOSIS — H72.91 PERFORATION OF RIGHT TYMPANIC MEMBRANE: ICD-10-CM

## 2022-06-06 DIAGNOSIS — H92.03 OTALGIA OF BOTH EARS: ICD-10-CM

## 2022-06-06 DIAGNOSIS — H74.03 TYMPANOSCLEROSIS OF BOTH EARS: ICD-10-CM

## 2022-06-06 DIAGNOSIS — Z98.890 S/P TYMPANOPLASTY: Primary | ICD-10-CM

## 2022-06-06 DIAGNOSIS — H93.13 TINNITUS OF BOTH EARS: ICD-10-CM

## 2022-06-06 PROCEDURE — 99024 POSTOP FOLLOW-UP VISIT: CPT | Performed by: OTOLARYNGOLOGY

## 2022-06-06 NOTE — PROGRESS NOTES
Casey Matute Jr, MD  [facility] EAR NOSE & THROAT  [address], [address]  [place] [address]  Fax [phone]  Phone [phone]      Visit Type: POST-OP   Chief Complaint   Patient presents with   • Post-op     3 wk post op ear clean out, drainage R ear. Hearing is muffled        HPI   Accompanied by: Mother  Deborah Barbosa is a 21 y.o.  female who presents for follow up s/p MYRINGOPLASTY, Examination under anesthesia - Right on 5/13/2022. The patient has had a relatively normal postoperative course and currently has no related complaints. She notes hearing not totally back. She had toribio drainage after surgery. Resolved now,    Past Medical History:   Diagnosis Date   • Anxiety    • Asthma    • HL (hearing loss)    • PCOS (polycystic ovarian syndrome)        Past Surgical History:   Procedure Laterality Date   • ADENOIDECTOMY     • HAND SURGERY Right    • MYRINGOPLASTY Right 5/13/2022    Procedure: MYRINGOPLASTY, Examination under anesthesia;  Surgeon: Casey Matute Jr., MD;  Location: [facility];  Service: ENT;  Laterality: Right;   • MYRINGOTOMY W/ TUBES      at 4 years old   • TONSILLECTOMY     • TYMPANOPLASTY         Family History: Her family history is not on file.     Social History: She  reports that she has never smoked. She has never used smokeless tobacco. She reports that she does not drink alcohol and does not use drugs.    Home Medications:  Fluticasone Furoate-Vilanterol, albuterol sulfate HFA, cetirizine, escitalopram, losartan, metFORMIN, montelukast, multivitamin with minerals, and norethindrone-ethinyl estradiol    Allergies:  She is allergic to betadine [povidone-iodine], latex, and oxycodone.       Vital Signs:   Temp:  [97.8 °F (36.6 °C)] 97.8 °F (36.6 °C)  Heart Rate:  [88] 88  BP: (147)/(92) 147/92  ENT Physical Exam  Constitutional  Appearance: patient appears well-developed and well-nourished,  Communication/Voice:  communication appropriate for developmental age; vocal quality normal;  Head and Face  Appearance: head appears normal, face appears normal and face appears atraumatic;  Palpation: facial palpation normal;  Salivary: glands normal;  Ear  Hearing: intact;  Auricles: right auricle normal; left auricle normal;  External Mastoids: right external mastoid normal; left external mastoid normal; External mastoid comments: Right postauricular incision   Ear Canals: right ear canal normal; left ear canal normal; Ear Canal comments: Mild right surgical changes  Tympanic Membranes: right tympanic membrane perforated (30% anterior marginal); marginal perforation anterior; perforation size: 30%; Tympanic Membrane comments: Right middle ear clean and dry   Nose  External Nose: nares patent bilaterally; external nose normal;  Oral Cavity/Oropharynx  Lips: normal;  Neck  Neck: neck normal;  Respiratory  Inspection: breathing unlabored; normal breathing rate;  Cardiovascular  Inspection: extremities are warm and well perfused; no peripheral edema present;  Neurovestibular  Mental Status: alert and oriented;  Psychiatric: mood normal; affect is appropriate;  Drawings            Result Review    RESULTS REVIEW    I have reviewed the patients old records in the chart.   I have reviewed the patients old records in the chart.     Assessment & Plan    Diagnoses and all orders for this visit:    1. S/P tympanoplasty (Primary)  Comments:  Myringoplasties    2. Tympanosclerosis of both ears    3. Conductive hearing loss, bilateral  Comments:  Related to perforation    4. Tinnitus of both ears    5. Otalgia of both ears    6. Perforation of right tympanic membrane  Comments:  Slightly smaller but still persistent       Conservative management.     Jose has residual perforation. I will continue to follow to see if this shrinks any further.  Because she has had prior tympanic membrane repair, I am concerned about vascularization of a  perforation.  She may require a lateral grafting technique.  She may also need to canal drill-out.  Alternatively, the patient could use hearing aids to overcome her hearing deficit.  She appears to have mostly conductive loss.  She has a dry middle ear.  I have discussed options, including tympanoplasty, hearing aids, [place].  I have recommended we see if the ear closes down at all, thus improving her right hearing.  I will follow closely.  Water precautions  Call for ear drainage      Return in about 3 months (around 9/6/2022) for Recheck Ears.      Casey Matute Jr, MD  06/06/22  11:37 CDT

## 2022-06-06 NOTE — PATIENT INSTRUCTIONS
WATER PRECAUTIONS FOR EARS    Protecting your ears from water may sometimes be necessary for the health of your ears.     > Ear plugs: You may use earplugs: over the counter silicone plugs or a cotton ball coated with vasoline when bathing. If conservative measures are not working, consider obtaining molded earplugs from the audiology department to use while bathing or swimming.   Purchase inexpensive types that are most comfortable for you. You can make your own by using cotton balls mixed with a generous amount of Vaseline petroleum jelly. Gently place these in the ear canal.    > Dry the ear canal: after getting out of the shower or bath, use a hair dryer on low heat blowing in the ear for 10-15 seconds. Pulling gently backwards on the ear straightens the ear canal and allows the air to get further down.    > If you are to use ear drops, please place them in the ear canal and give them a few seconds to run down.  Follow this by blowing in the ear canal with a hair dryer set on low heat for approximately 10 to 15 seconds.  You may do this multiple times during the day to help keep the ear canal dry.    >If you are swimming frequently- place a drop of oil in each ear canal prior to entering water. After you are finished in the water, place a drop of vinegar in each ear canal. Use a hair dryer on low heat to blow in each ear canal for 10-15 seconds to dry ears out.     Hearing aid referral      CONTACT INFORMATION:  The main office phone number is [phone]. For emergencies after hours and on weekends, this number will convert over to our answering service and the on call provider will answer. Please try to keep non emergent phone calls/ questions to office hours 9am-5pm Monday through Friday.     [employer]  As an alternative, you can sign up and use the Epic MyChart system for more direct and quicker access for non emergent questions/ problems.  [facility] [employer] allows you to send messages to your doctor,  view your test results, renew your prescriptions, schedule appointments, and more. To sign up, go to [email or web address] and click on the Sign Up Now link in the New User? box. Enter your [employer] Activation Code exactly as it appears below along with the last four digits of your Social Security Number and your Date of Birth () to complete the sign-up process. If you do not sign up before the expiration date, you must request a new code.    [employer] Activation Code: Activation code not generated  Current [employer] Status: Active    If you have questions, you can email [email or web address] or call [phone] to talk to our [employer] staff. Remember, [employer] is NOT to be used for urgent needs. For medical emergencies, dial 911.

## 2022-09-12 ENCOUNTER — OFFICE VISIT (OUTPATIENT)
Dept: OTOLARYNGOLOGY | Facility: CLINIC | Age: 22
End: 2022-09-12

## 2022-09-12 ENCOUNTER — TELEPHONE (OUTPATIENT)
Dept: OTOLARYNGOLOGY | Facility: CLINIC | Age: 22
End: 2022-09-12

## 2022-09-12 VITALS
HEIGHT: 64 IN | SYSTOLIC BLOOD PRESSURE: 149 MMHG | BODY MASS INDEX: 42.44 KG/M2 | TEMPERATURE: 97 F | DIASTOLIC BLOOD PRESSURE: 97 MMHG | HEART RATE: 109 BPM | WEIGHT: 248.6 LBS

## 2022-09-12 DIAGNOSIS — H72.92 PERFORATED TYMPANIC MEMBRANE, LEFT: ICD-10-CM

## 2022-09-12 DIAGNOSIS — H92.03 OTALGIA OF BOTH EARS: ICD-10-CM

## 2022-09-12 DIAGNOSIS — H74.03 TYMPANOSCLEROSIS OF BOTH EARS: ICD-10-CM

## 2022-09-12 DIAGNOSIS — H72.91 PERFORATION OF RIGHT TYMPANIC MEMBRANE: ICD-10-CM

## 2022-09-12 DIAGNOSIS — H90.0 CONDUCTIVE HEARING LOSS, BILATERAL: ICD-10-CM

## 2022-09-12 DIAGNOSIS — Z98.890 S/P TYMPANOPLASTY: Primary | ICD-10-CM

## 2022-09-12 DIAGNOSIS — H93.13 TINNITUS OF BOTH EARS: ICD-10-CM

## 2022-09-12 PROCEDURE — 92504 EAR MICROSCOPY EXAMINATION: CPT | Performed by: OTOLARYNGOLOGY

## 2022-09-12 PROCEDURE — 99213 OFFICE O/P EST LOW 20 MIN: CPT | Performed by: OTOLARYNGOLOGY

## 2022-09-12 RX ORDER — CEFDINIR 300 MG/1
CAPSULE ORAL
COMMUNITY
Start: 2022-09-06 | End: 2022-12-07

## 2022-09-12 RX ORDER — ESCITALOPRAM OXALATE 10 MG/1
TABLET ORAL
COMMUNITY
Start: 2022-08-18 | End: 2022-12-07

## 2022-09-12 NOTE — PATIENT INSTRUCTIONS
CONTACT INFORMATION:  The main office phone number is [phone]. For emergencies after hours and on weekends, this number will convert over to our answering service and the on call provider will answer. Please try to keep non emergent phone calls/ questions to office hours 9am-5pm Monday through Friday.     [employer]  As an alternative, you can sign up and use the Epic MyChart system for more direct and quicker access for non emergent questions/ problems.  [facility] [employer] allows you to send messages to your doctor, view your test results, renew your prescriptions, schedule appointments, and more. To sign up, go to [employer] and click on the Sign Up Now link in the New User? box. Enter your [employer] Activation Code exactly as it appears below along with the last four digits of your Social Security Number and your Date of Birth () to complete the sign-up process. If you do not sign up before the expiration date, you must request a new code.    [employer] Activation Code: Activation code not generated  Current [employer] Status: Active    If you have questions, you can email [email or web address] or call [phone] to talk to our [employer] staff. Remember, [employer] is NOT to be used for urgent needs. For medical emergencies, dial 911.

## 2022-09-12 NOTE — TELEPHONE ENCOUNTER
Left message for patient with details of appt with Dr Toro on 9-19-22 at 3:15. She needs to bring a copy of her audio from Dr Craft to her appt. Requested call back to confirm this appt works with her school schedule.

## 2022-09-12 NOTE — PROGRESS NOTES
[facility] Otolaryngology Head and Neck Surgery  PROCEDURE NOTE    Anesthesia: none    Diagnosis:   1. S/P tympanoplasty    2. Tympanosclerosis of both ears    3. Conductive hearing loss, bilateral    4. Tinnitus of both ears    5. Otalgia of both ears    6. Perforation of right tympanic membrane    7. Perforated tympanic membrane, left         Procedure:  Ear microscopy bilateral    Procedure Details:    The patient was placed supine on the procedure table. Using a speculum, the ear was examined with the microscope. Using micro-instrumentation, the ear canal was cleaned on the right, evaluated on the left  The  Middle ear was evaluated    Findings:   Ear Canal:   LEFT: Normal skin, mild anterior overhang  RIGHT: Normal skin, plaque of earwax obstructing visualization of the tympanic membrane  Tympanic Membrane:   LEFT: Inferior rim of tympanosclerosis with a 1 to 2% perforation just below the tip of the malleus that appears ragged  RIGHT: Greater than 50% posterior and inferior perforation with malleus intact, anterior sulcus intact  Ossicular Chain:   BILATERAL: Appears intact  Middle ear space:   LEFT: Intact, no fluid  RIGHT: Intact, anterior scarring visualized medial to the tympanic membrane, middle ear clean and dry    Condition:  Stable.  Patient tolerated procedure well.    Complications:  None    Post-procedure instructions reviewed with Patient, Mother  Ear instructions

## 2022-10-24 ENCOUNTER — OFFICE VISIT (OUTPATIENT)
Dept: OTOLARYNGOLOGY | Facility: CLINIC | Age: 22
End: 2022-10-24

## 2022-10-24 VITALS
BODY MASS INDEX: 42.65 KG/M2 | RESPIRATION RATE: 18 BRPM | HEIGHT: 64 IN | WEIGHT: 249.8 LBS | OXYGEN SATURATION: 98 % | HEART RATE: 120 BPM | TEMPERATURE: 97.8 F

## 2022-10-24 DIAGNOSIS — H74.03 TYMPANOSCLEROSIS OF BOTH EARS: ICD-10-CM

## 2022-10-24 DIAGNOSIS — H72.91 PERFORATION OF RIGHT TYMPANIC MEMBRANE: Primary | ICD-10-CM

## 2022-10-24 DIAGNOSIS — H90.6 MIXED CONDUCTIVE AND SENSORINEURAL HEARING LOSS OF BOTH EARS: ICD-10-CM

## 2022-10-24 DIAGNOSIS — Z98.890 S/P TYMPANOPLASTY: ICD-10-CM

## 2022-10-24 PROCEDURE — 99214 OFFICE O/P EST MOD 30 MIN: CPT | Performed by: OTOLARYNGOLOGY

## 2022-10-24 NOTE — PROGRESS NOTES
JONAS Toro MD  [facility] EAR [facility]SE & THROAT  [address], [address]  [facility] [address]  Dept: [phone]  Dept Fax: [phone]  Loc: [phone]       Visit Type: FOLLOW UP   Chief Complaint   Patient presents with   • Perforation of right tympanic membrane     Patient states that she is here to schedule surgery         HPI  Deborah Barbosa is a 22 y.o. female who presents today for follow-up She saw Dr. Matute previously. She is new to me. She is here to discuss eardrum perforation and some hearing loss. She is accompanied by an adult female.    The patient reports that when she was 16 years of age her eardrums ruptured. She adds that she had bilateral eardrum repair surgery performed by Dr. Cornell. She states that her right ear did not heal completely, her hearing got worse, and her ear became painful. The patient states that she has a lot of scar tissue in both of her ears. She adds that her ears itch a lot, mostly her right one. On 05/13/2022 Dr. Matute performed a right myringoplasty.    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    Past Medical History:   Diagnosis Date   • Anxiety    • Asthma    • HL (hearing loss)    • PCOS (polycystic ovarian syndrome)        Past Surgical History:   Procedure Laterality Date   • ADENOIDECTOMY     • HAND SURGERY Right    • MYRINGOPLASTY Right 5/13/2022    Procedure: MYRINGOPLASTY, Examination under anesthesia;  Surgeon: Casey Matute Jr., MD;  Location: [facility];  Service: ENT;  Laterality: Right;   • MYRINGOTOMY W/ TUBES      at 4 years old   • TONSILLECTOMY     • TYMPANOPLASTY         Family History: Her family history is not on file.     Social History: She  reports that she has never smoked. She has never used smokeless tobacco. She reports that she does not drink alcohol and does not use drugs.    Home Medications:  Fluticasone  Furoate-Vilanterol, albuterol sulfate HFA, cefdinir, cetirizine, escitalopram, losartan, metFORMIN, montelukast, multivitamin with minerals, and norethindrone-ethinyl estradiol    Allergies:  She is allergic to betadine [povidone-iodine], latex, and oxycodone.       Vital Signs:   Temp:  [97.8 °F (36.6 °C)] 97.8 °F (36.6 °C)  Heart Rate:  [120] 120  Resp:  [18] 18  ENT Physical Exam  Constitutional  Appearance: patient appears well-developed and well-nourished,  Communication/Voice: communication appropriate for developmental age; vocal quality normal;  Head and Face  Appearance: head appears normal, face appears normal and face appears atraumatic;  Palpation: facial palpation normal;  Salivary: glands normal;  Ear  Hearing: intact;  Auricles: right auricle normal; left auricle normal;  External Mastoids: right external mastoid normal; left external mastoid normal;  Ear comments: Both canals have no cerumen and are normal.  The right eardrum has about a 50 to 55% perforation that is central. It is in the lower aspect of the eardrum, more in the posterior quadrant.   The left tympanic membrane has a lot of myringosclerosis, but I do not see an obvious hole in the eardrum.Tuning fork examination: Lock examination goes off to the right-hand side. Bone conduction is greater than air conduction bilaterally.  Nose  External Nose: nares patent bilaterally; external nose normal;  Oral Cavity/Oropharynx  Lips: normal;  Neck  Neck: neck normal;  Respiratory  Inspection: breathing unlabored; normal breathing rate;  Cardiovascular  Inspection: extremities are warm and well perfused; no peripheral edema present;  Neurovestibular  Mental Status: alert and oriented;  Psychiatric: mood normal; affect is appropriate;         Result Review    RESULTS REVIEW    09/16/2022 hearing test revealed bilateral conductive hearing loss. Speech reception is normal, as is her understanding of words.    Assessment & Plan    Diagnoses and all  orders for this visit:    1. Perforation of right tympanic membrane (Primary)  -     Case Request; Standing  -     Case Request    2. S/P tympanoplasty  Overview:  myringoplasty      3. Mixed conductive and sensorineural hearing loss of both ears  Overview:  Added automatically from request for surgery [id number]    Orders:  -     Case Request; Standing  -     Case Request    4. Tympanosclerosis of both ears  -     Case Request; Standing  -     Case Request    Other orders  -     Follow Anesthesia Guidelines / Standing Orders  -     Provide Patient With Instructions on NPO Status  -     Follow Anesthesia Guidelines / Standing Orders; Standing  -     Verify NPO Status; Standing  -     Obtain Informed Consent; Standing  -     SCD (Sequential Compression Device) - To Be Placed on Patient in Pre-Op; Standing  -     Patient to Void Prior to Transfer to OR; Standing  -     Instructions for Nursing; Standing  -     NPO Diet NPO Type: Strict NPO; Standing    We discussed that her hearing loss is likely caused by the perforation. She was offered options including doing nothing, a hearing aid, or surgical repair either with me or with a surgeon at [place].     Medical and surgical options were discussed including medical and surgical options. Risks, benefits and alternatives were discussed and questions were answered. After considering the options, the patient decided to proceed with surgery.     -----SURGERY SCHEDULING:-----  Schedule TYMPANOPLASTY (Right)    ---INFORMED CONSENT DISCUSSION:---  TYMPANOPLASTY: The risks and benefits of tympanoplasty were explained including but not limited to bleeding, infection, risks of the general anesthesia, pain, hearing loss, vertigo, aural fullness, the possibility of a post -auricular approach, possible need for mastoidectomy, persistent perforation, and nerve injury including facial (with facial paralysis) and chorda typani (with dysguesia) nerves. Alternatives were discussed. The  patient/parents demonstrated understanding of these risks. Questions were asked appropriately answered. No guarantees were made or implied.      ---PREOPERATIVE WORKUP:---  labs/ workup per anesthesia    Return for follow up postoperatively.          Transcribed from ambient dictation for Tung Toro MD by Briana Wood.  10/24/22   09:29 CDT    Patient or patient representative verbalized consent to the visit recording.  I have personally performed the services described in this document as transcribed by the above individual, and it is both accurate and complete.  Tung Toro MD  10/24/2022  11:43 CDT

## 2022-10-25 PROBLEM — H90.6 MIXED CONDUCTIVE AND SENSORINEURAL HEARING LOSS OF BOTH EARS: Status: ACTIVE | Noted: 2022-10-25

## 2022-12-07 ENCOUNTER — PRE-ADMISSION TESTING (OUTPATIENT)
Dept: PREADMISSION TESTING | Facility: HOSPITAL | Age: 22
End: 2022-12-07

## 2022-12-07 VITALS
HEIGHT: 64 IN | HEART RATE: 85 BPM | BODY MASS INDEX: 42.64 KG/M2 | DIASTOLIC BLOOD PRESSURE: 81 MMHG | OXYGEN SATURATION: 99 % | SYSTOLIC BLOOD PRESSURE: 121 MMHG | WEIGHT: 249.78 LBS | RESPIRATION RATE: 18 BRPM

## 2022-12-07 LAB
ANION GAP SERPL CALCULATED.3IONS-SCNC: 9 MMOL/L (ref 5–15)
BUN SERPL-MCNC: 11 MG/DL (ref 6–20)
BUN/CREAT SERPL: 14.5 (ref 7–25)
CALCIUM SPEC-SCNC: 9.3 MG/DL (ref 8.6–10.5)
CHLORIDE SERPL-SCNC: 106 MMOL/L (ref 98–107)
CO2 SERPL-SCNC: 25 MMOL/L (ref 22–29)
CREAT SERPL-MCNC: 0.76 MG/DL (ref 0.57–1)
DEPRECATED RDW RBC AUTO: 42.7 FL (ref 37–54)
EGFRCR SERPLBLD CKD-EPI 2021: 115.2 ML/MIN/1.73
ERYTHROCYTE [DISTWIDTH] IN BLOOD BY AUTOMATED COUNT: 14.1 % (ref 12.3–15.4)
GLUCOSE SERPL-MCNC: 90 MG/DL (ref 65–99)
HCT VFR BLD AUTO: 40.9 % (ref 34–46.6)
HGB BLD-MCNC: 12.9 G/DL (ref 12–15.9)
MCH RBC QN AUTO: 26.5 PG (ref 26.6–33)
MCHC RBC AUTO-ENTMCNC: 31.5 G/DL (ref 31.5–35.7)
MCV RBC AUTO: 84.2 FL (ref 79–97)
PLATELET # BLD AUTO: 489 10*3/MM3 (ref 140–450)
PMV BLD AUTO: 9.5 FL (ref 6–12)
POTASSIUM SERPL-SCNC: 4.4 MMOL/L (ref 3.5–5.2)
RBC # BLD AUTO: 4.86 10*6/MM3 (ref 3.77–5.28)
SODIUM SERPL-SCNC: 140 MMOL/L (ref 136–145)
WBC NRBC COR # BLD: 11.95 10*3/MM3 (ref 3.4–10.8)

## 2022-12-07 PROCEDURE — 85027 COMPLETE CBC AUTOMATED: CPT

## 2022-12-07 PROCEDURE — 80048 BASIC METABOLIC PNL TOTAL CA: CPT

## 2022-12-07 PROCEDURE — 36415 COLL VENOUS BLD VENIPUNCTURE: CPT

## 2022-12-07 NOTE — DISCHARGE INSTRUCTIONS
Before you come to the hospital        Arrival time: AS DIRECTED BY OFFICE     YOU MAY TAKE THE FOLLOWING MEDICATION(S) THE MORNING OF SURGERY WITH A SIP OF WATER: NONE    DO NOT TAKE LOSARTAN 24 HOURS PRIOR TO SURGERY           ALL OTHER HOME MEDICATION CHECK WITH YOUR PHYSICIAN (especially if   you are taking diabetes medicines or blood thinners)      If you were given and instructed to use a germ- killing soap, use as directed the night before surgery and again the morning of surgery or as directed by your surgeon. (Use half of the bottle with each shower.)   See attached information for How to Use Chlorhexidine for Bathing if applicable.            Eating and drinking restrictions prior to scheduled arrival time    2 Hours before arrival time STOP   Drinking Clear liquids (water, apple juice-no pulp)     6 Hours before arrival time STOP   Milk or drinks that contain milk, full liquids    6 Hours before arrival time STOP   Light meals or foods, such as toast or cereal    8 Hours before arrival time STOP   Heavy foods, such as meat, fried foods, or fatty foods    (It is extremely important that you follow these guidelines to prevent delay or cancelation of your procedure)     Clear Liquids  Water and flavored water                                                                      Clear Fruit juices, such as cranberry juice and apple juice.  Black coffee (NO cream of any kind, including powdered).  Plain tea  Clear bouillon or broth.  Flavored gelatin.  Soda.  Gatorade or Powerade.  Full liquid examples  Juices that have pulp.  Frozen ice pops that contain fruit pieces.  Coffee with creamer  Milk.  Yogurt.                MANAGING PAIN AFTER SURGERY    We know you are probably wondering what your pain will be like after surgery.  Following surgery it is unrealistic to expect you will not have pain.   Pain is how our bodies let us know that something is wrong or cautions us to be careful.  That said, our goal is  to make your pain tolerable.    Methods we may use to treat your pain include (oral or IV medications, PCAs, epidurals, nerve blocks, etc.)   While some procedures require IV pain medications for a short time after surgery, transitioning to pain medications by mouth allows for better management of pain.   Your nurse will encourage you to take oral pain medications whenever possible.  IV medications work almost immediately, but only last a short while.  Taking medications by mouth allows for a more constant level of medication in your blood stream for a longer period of time.      Once your pain is out of control it is harder to get back under control.  It is important you are aware when your next dose of pain medication is due.  If you are admitted, your nurse may write the time of your next dose on the white board in your room to help you remember.      We are interested in your pain and encourage you to inform us about aggravating factors during your visit.   Many times a simple repositioning every few hours can make a big difference.    If your physician says it is okay, do not let your pain prevent you from getting out of bed. Be sure to call your nurse for assistance prior to getting up so you do not fall.      Before surgery, please decide your tolerable pain goal.  These faces help describe the pain ratings we use on a 0-10 scale.   Be prepared to tell us your goal and whether or not you take pain or anxiety medications at home.          Preparing for Surgery  Preparing for surgery is an important part of your care. It can make things go more smoothly and help you avoid complications. The steps leading up to surgery may vary among hospitals. Follow all instructions given to you by your health care providers. Ask questions if you do not understand something. Talk about any concerns that you have.  Here are some questions to consider asking before your surgery:  If my surgery is not an emergency (is elective),  when would be the best time to have the surgery?  What arrangements do I need to make for work, home, or school?  What will my recovery be like? How long will it be before I can return to normal activities?  Will I need to prepare my home? Will I need to arrange care for me or my children?  Should I expect to have pain after surgery? What are my pain management options? Are there nonmedical options that I can try for pain?  Tell a health care provider about:  Any allergies you have.  All medicines you are taking, including vitamins, herbs, eye drops, creams, and over-the-counter medicines.  Any problems you or family members have had with anesthetic medicines.  Any blood disorders you have.  Any surgeries you have had.  Any medical conditions you have.  Whether you are pregnant or may be pregnant.  What are the risks?  The risks and complications of surgery depend on the specific procedure that you have. Discuss all the risks with your health care providers before your surgery. Ask about common surgical complications, which may include:  Infection.  Bleeding or a need for blood replacement (transfusion).  Allergic reactions to medicines.  Damage to surrounding nerves, tissues, or structures.  A blood clot.  Scarring.  Failure of the surgery to correct the problem.  Follow these instructions before the procedure:  Several days or weeks before your procedure  You may have a physical exam by your primary health care provider to make sure it is safe for you to have surgery.  You may have testing. This may include a chest X-ray, blood and urine tests, electrocardiogram (ECG), or other testing.  Ask your health care provider about:  Changing or stopping your regular medicines. This is especially important if you are taking diabetes medicines or blood thinners.  Taking medicines such as aspirin and ibuprofen. These medicines can thin your blood. Do not take these medicines unless your health care provider tells you to take  them.  Taking over-the-counter medicines, vitamins, herbs, and supplements.  Do not use any products that contain nicotine or tobacco, such as cigarettes and e-cigarettes. If you need help quitting, ask your health care provider.  Avoid alcohol.  Ask your health care provider if there are exercises you can do to prepare for surgery.  Eat a healthy diet.   Plan to have someone take you home from the hospital or clinic.  Plan to have a responsible adult care for you for at least 24 hours after you leave the hospital or clinic. This is important.  The day before your procedure  You may be given antibiotic medicine to take by mouth to help prevent infection. Take it as told by your health care provider.  You may be asked to shower with a germ-killing soap.  Follow instructions from your health care provider about eating and drinking restrictions. This includes gum, mints and hard candy.  Pack comfortable clothes according to your procedure.   The day of your procedure  You may need to take another shower with a germ-killing soap before you leave home in the morning.  With a small sip of water, take only the medicines that you are told to take.  Remove all jewelry including rings.   Leave anything you consider valuable at home except hearing aids if needed.  You do not need to bring your home medications into the hospital.   Do not wear any makeup, nail polish, powder, deodorant, lotion, hair accessories, or anything on your skin or body except your clothes.  If you will be staying in the hospital, bring a case to hold your glasses, contacts, or dentures. You may also want to bring your robe and non-skid footwear.  If you wear oxygen at home, bring it with you the day of surgery.  If instructed by your health care provider, bring your sleep apnea device with you on the day of your surgery (if this applies to you).  You may want to leave your suitcase and sleep apnea device in the car until after surgery.   Arrive at the  hospital as scheduled.  Bring a friend or family member with you who can help to answer questions and be present while you meet with your health care provider.  At the hospital  When you arrive at the hospital:  Go to registration located at the main entrance of the hospital. You will be registered and given a beeper and a sticker sheet. Take the stickers to the Outpatient nurses desk and place in the black tray. This is to notify staff that you have arrived. Then return to the lobby to wait.   When your beeper lights up and vibrates proceed through the double doors, under the stairs, and a member of the Outpatient Surgery staff will escort you to your preoperative room.  You may have to wear compression sleeves. These help to prevent blood clots and reduce swelling in your legs.  An IV may be inserted into one of your veins.              In the operating room, you may be given one or more of the following:        A medicine to help you relax (sedative).        A medicine to numb the area (local anesthetic).        A medicine to make you fall asleep (general anesthetic).        A medicine that is injected into an area of your body to numb everything below the                      injection site (regional anesthetic).  You may be given an antibiotic through your IV to help prevent infection.  Your surgical site will be marked or identified.    Contact a health care provider if you:  Develop a fever of more than 100.4°F (38°C) or other feelings of illness during the 48 hours before your surgery.  Have symptoms that get worse.  Have questions or concerns about your surgery.  Summary  Preparing for surgery can make the procedure go more smoothly and lower your risk of complications.  Before surgery, make a list of questions and concerns to discuss with your surgeon. Ask about the risks and possible complications.  In the days or weeks before your surgery, follow all instructions from your health care provider. You may  need to stop smoking, avoid alcohol, follow eating restrictions, and change or stop your regular medicines.  Contact your surgeon if you develop a fever or other signs of illness during the few days before your surgery.  This information is not intended to replace advice given to you by your health care provider. Make sure you discuss any questions you have with your health care provider.  Document Revised: 12/21/2018 Document Reviewed: 10/23/2018  [employer] Patient Education © 2021 [employer]

## 2022-12-28 ENCOUNTER — TELEPHONE (OUTPATIENT)
Dept: OTOLARYNGOLOGY | Facility: CLINIC | Age: 22
End: 2022-12-28

## 2022-12-28 NOTE — TELEPHONE ENCOUNTER
Pt notified of 0645 surgery arrival time, with nothing to eat or drink past midnight the night before. Pt verbalized understanding.

## 2022-12-30 ENCOUNTER — ANESTHESIA EVENT (OUTPATIENT)
Dept: PERIOP | Facility: HOSPITAL | Age: 22
End: 2022-12-30
Payer: COMMERCIAL

## 2022-12-30 ENCOUNTER — ANESTHESIA (OUTPATIENT)
Dept: PERIOP | Facility: HOSPITAL | Age: 22
End: 2022-12-30
Payer: COMMERCIAL

## 2022-12-30 ENCOUNTER — HOSPITAL ENCOUNTER (OUTPATIENT)
Facility: HOSPITAL | Age: 22
Setting detail: HOSPITAL OUTPATIENT SURGERY
Discharge: HOME | End: 2022-12-30
Attending: OTOLARYNGOLOGY | Admitting: OTOLARYNGOLOGY
Payer: COMMERCIAL

## 2022-12-30 VITALS
TEMPERATURE: 97.8 F | DIASTOLIC BLOOD PRESSURE: 96 MMHG | SYSTOLIC BLOOD PRESSURE: 143 MMHG | RESPIRATION RATE: 20 BRPM | OXYGEN SATURATION: 96 % | HEART RATE: 89 BPM

## 2022-12-30 DIAGNOSIS — Z98.890 S/P TYMPANOPLASTY: Primary | ICD-10-CM

## 2022-12-30 LAB — B-HCG UR QL: NEGATIVE

## 2022-12-30 PROCEDURE — 25010000002 EPINEPHRINE PER 0.1 MG: Performed by: OTOLARYNGOLOGY

## 2022-12-30 PROCEDURE — 25010000002 DEXAMETHASONE PER 1 MG: Performed by: ANESTHESIOLOGY

## 2022-12-30 PROCEDURE — 25010000002 FENTANYL CITRATE (PF) 100 MCG/2ML SOLUTION

## 2022-12-30 PROCEDURE — 25010000002 ONDANSETRON PER 1 MG

## 2022-12-30 PROCEDURE — 69631 REPAIR EARDRUM STRUCTURES: CPT | Performed by: OTOLARYNGOLOGY

## 2022-12-30 PROCEDURE — 25010000002 MIDAZOLAM PER 1 MG

## 2022-12-30 PROCEDURE — 15770 DERMA-FAT-FASCIA GRAFT: CPT | Performed by: OTOLARYNGOLOGY

## 2022-12-30 PROCEDURE — 81025 URINE PREGNANCY TEST: CPT | Performed by: OTOLARYNGOLOGY

## 2022-12-30 PROCEDURE — 25010000002 PROPOFOL 10 MG/ML EMULSION

## 2022-12-30 DEVICE — HEMO ABS GELFOAM SPNG PORCN SZ12TO7: Type: IMPLANTABLE DEVICE | Site: EAR | Status: FUNCTIONAL

## 2022-12-30 RX ORDER — BALANCED SALT SOLUTION 6.4; .75; .48; .3; 3.9; 1.7 MG/ML; MG/ML; MG/ML; MG/ML; MG/ML; MG/ML
SOLUTION OPHTHALMIC AS NEEDED
Status: DISCONTINUED | OUTPATIENT
Start: 2022-12-30 | End: 2022-12-30 | Stop reason: HOSPADM

## 2022-12-30 RX ORDER — EPINEPHRINE 1 MG/ML
INJECTION, SOLUTION, CONCENTRATE INTRAVENOUS AS NEEDED
Status: DISCONTINUED | OUTPATIENT
Start: 2022-12-30 | End: 2022-12-30 | Stop reason: HOSPADM

## 2022-12-30 RX ORDER — LABETALOL HYDROCHLORIDE 5 MG/ML
5 INJECTION, SOLUTION INTRAVENOUS
Status: DISCONTINUED | OUTPATIENT
Start: 2022-12-30 | End: 2022-12-30 | Stop reason: HOSPADM

## 2022-12-30 RX ORDER — LIDOCAINE HYDROCHLORIDE 10 MG/ML
0.5 INJECTION, SOLUTION EPIDURAL; INFILTRATION; INTRACAUDAL; PERINEURAL ONCE AS NEEDED
Status: DISCONTINUED | OUTPATIENT
Start: 2022-12-30 | End: 2022-12-30 | Stop reason: HOSPADM

## 2022-12-30 RX ORDER — DROPERIDOL 2.5 MG/ML
0.62 INJECTION, SOLUTION INTRAMUSCULAR; INTRAVENOUS ONCE AS NEEDED
Status: DISCONTINUED | OUTPATIENT
Start: 2022-12-30 | End: 2022-12-30 | Stop reason: HOSPADM

## 2022-12-30 RX ORDER — ACETAMINOPHEN 325 MG/1
650 TABLET ORAL EVERY 6 HOURS PRN
Status: DISCONTINUED | OUTPATIENT
Start: 2022-12-30 | End: 2022-12-30 | Stop reason: HOSPADM

## 2022-12-30 RX ORDER — ONDANSETRON 2 MG/ML
INJECTION INTRAMUSCULAR; INTRAVENOUS AS NEEDED
Status: DISCONTINUED | OUTPATIENT
Start: 2022-12-30 | End: 2022-12-30 | Stop reason: SURG

## 2022-12-30 RX ORDER — DEXAMETHASONE SODIUM PHOSPHATE 4 MG/ML
4 INJECTION, SOLUTION INTRA-ARTICULAR; INTRALESIONAL; INTRAMUSCULAR; INTRAVENOUS; SOFT TISSUE ONCE AS NEEDED
Status: COMPLETED | OUTPATIENT
Start: 2022-12-30 | End: 2022-12-30

## 2022-12-30 RX ORDER — OXYCODONE AND ACETAMINOPHEN 10; 325 MG/1; MG/1
1 TABLET ORAL ONCE AS NEEDED
Status: DISCONTINUED | OUTPATIENT
Start: 2022-12-30 | End: 2022-12-30

## 2022-12-30 RX ORDER — ONDANSETRON 2 MG/ML
4 INJECTION INTRAMUSCULAR; INTRAVENOUS ONCE AS NEEDED
Status: DISCONTINUED | OUTPATIENT
Start: 2022-12-30 | End: 2022-12-30 | Stop reason: HOSPADM

## 2022-12-30 RX ORDER — FENTANYL CITRATE 50 UG/ML
INJECTION, SOLUTION INTRAMUSCULAR; INTRAVENOUS AS NEEDED
Status: DISCONTINUED | OUTPATIENT
Start: 2022-12-30 | End: 2022-12-30 | Stop reason: SURG

## 2022-12-30 RX ORDER — FLUMAZENIL 0.1 MG/ML
0.2 INJECTION INTRAVENOUS AS NEEDED
Status: DISCONTINUED | OUTPATIENT
Start: 2022-12-30 | End: 2022-12-30 | Stop reason: HOSPADM

## 2022-12-30 RX ORDER — MIDAZOLAM HYDROCHLORIDE 1 MG/ML
INJECTION INTRAMUSCULAR; INTRAVENOUS AS NEEDED
Status: DISCONTINUED | OUTPATIENT
Start: 2022-12-30 | End: 2022-12-30 | Stop reason: SURG

## 2022-12-30 RX ORDER — PROPOFOL 10 MG/ML
VIAL (ML) INTRAVENOUS AS NEEDED
Status: DISCONTINUED | OUTPATIENT
Start: 2022-12-30 | End: 2022-12-30 | Stop reason: SURG

## 2022-12-30 RX ORDER — HYDROCODONE BITARTRATE AND ACETAMINOPHEN 5; 325 MG/1; MG/1
1 TABLET ORAL EVERY 4 HOURS PRN
Qty: 20 TABLET | Refills: 0 | Status: SHIPPED | OUTPATIENT
Start: 2022-12-30 | End: 2023-01-02

## 2022-12-30 RX ORDER — SODIUM CHLORIDE 0.9 % (FLUSH) 0.9 %
10 SYRINGE (ML) INJECTION AS NEEDED
Status: DISCONTINUED | OUTPATIENT
Start: 2022-12-30 | End: 2022-12-30 | Stop reason: HOSPADM

## 2022-12-30 RX ORDER — MAGNESIUM HYDROXIDE 1200 MG/15ML
LIQUID ORAL AS NEEDED
Status: DISCONTINUED | OUTPATIENT
Start: 2022-12-30 | End: 2022-12-30 | Stop reason: HOSPADM

## 2022-12-30 RX ORDER — MIDAZOLAM HYDROCHLORIDE 1 MG/ML
1 INJECTION INTRAMUSCULAR; INTRAVENOUS
Status: DISCONTINUED | OUTPATIENT
Start: 2022-12-30 | End: 2022-12-30 | Stop reason: HOSPADM

## 2022-12-30 RX ORDER — NALOXONE HCL 0.4 MG/ML
0.4 VIAL (ML) INJECTION AS NEEDED
Status: DISCONTINUED | OUTPATIENT
Start: 2022-12-30 | End: 2022-12-30 | Stop reason: HOSPADM

## 2022-12-30 RX ORDER — LIDOCAINE HYDROCHLORIDE 20 MG/ML
INJECTION, SOLUTION EPIDURAL; INFILTRATION; INTRACAUDAL; PERINEURAL AS NEEDED
Status: DISCONTINUED | OUTPATIENT
Start: 2022-12-30 | End: 2022-12-30 | Stop reason: SURG

## 2022-12-30 RX ORDER — NEOSTIGMINE METHYLSULFATE 5 MG/5 ML
SYRINGE (ML) INTRAVENOUS AS NEEDED
Status: DISCONTINUED | OUTPATIENT
Start: 2022-12-30 | End: 2022-12-30 | Stop reason: SURG

## 2022-12-30 RX ORDER — OXYCODONE AND ACETAMINOPHEN 7.5; 325 MG/1; MG/1
2 TABLET ORAL EVERY 4 HOURS PRN
Status: DISCONTINUED | OUTPATIENT
Start: 2022-12-30 | End: 2022-12-30

## 2022-12-30 RX ORDER — FENTANYL CITRATE 50 UG/ML
25 INJECTION, SOLUTION INTRAMUSCULAR; INTRAVENOUS
Status: DISCONTINUED | OUTPATIENT
Start: 2022-12-30 | End: 2022-12-30 | Stop reason: HOSPADM

## 2022-12-30 RX ORDER — BACITRACIN ZINC 500 [USP'U]/G
OINTMENT TOPICAL AS NEEDED
Status: DISCONTINUED | OUTPATIENT
Start: 2022-12-30 | End: 2022-12-30 | Stop reason: HOSPADM

## 2022-12-30 RX ORDER — SCOLOPAMINE TRANSDERMAL SYSTEM 1 MG/1
1 PATCH, EXTENDED RELEASE TRANSDERMAL ONCE
Status: DISCONTINUED | OUTPATIENT
Start: 2022-12-30 | End: 2022-12-30 | Stop reason: HOSPADM

## 2022-12-30 RX ORDER — SODIUM CHLORIDE, SODIUM LACTATE, POTASSIUM CHLORIDE, CALCIUM CHLORIDE 600; 310; 30; 20 MG/100ML; MG/100ML; MG/100ML; MG/100ML
9 INJECTION, SOLUTION INTRAVENOUS CONTINUOUS
Status: DISCONTINUED | OUTPATIENT
Start: 2022-12-30 | End: 2022-12-30 | Stop reason: HOSPADM

## 2022-12-30 RX ORDER — ROCURONIUM BROMIDE 10 MG/ML
INJECTION, SOLUTION INTRAVENOUS AS NEEDED
Status: DISCONTINUED | OUTPATIENT
Start: 2022-12-30 | End: 2022-12-30 | Stop reason: SURG

## 2022-12-30 RX ORDER — SODIUM CHLORIDE 0.9 % (FLUSH) 0.9 %
3 SYRINGE (ML) INJECTION AS NEEDED
Status: DISCONTINUED | OUTPATIENT
Start: 2022-12-30 | End: 2022-12-30 | Stop reason: HOSPADM

## 2022-12-30 RX ORDER — SODIUM CHLORIDE 0.9 % (FLUSH) 0.9 %
10 SYRINGE (ML) INJECTION EVERY 12 HOURS SCHEDULED
Status: DISCONTINUED | OUTPATIENT
Start: 2022-12-30 | End: 2022-12-30 | Stop reason: HOSPADM

## 2022-12-30 RX ORDER — DEXTROSE MONOHYDRATE 25 G/50ML
12.5 INJECTION, SOLUTION INTRAVENOUS AS NEEDED
Status: DISCONTINUED | OUTPATIENT
Start: 2022-12-30 | End: 2022-12-30 | Stop reason: HOSPADM

## 2022-12-30 RX ORDER — IBUPROFEN 600 MG/1
600 TABLET ORAL ONCE AS NEEDED
Status: DISCONTINUED | OUTPATIENT
Start: 2022-12-30 | End: 2022-12-30 | Stop reason: HOSPADM

## 2022-12-30 RX ORDER — LIDOCAINE HYDROCHLORIDE 40 MG/ML
SOLUTION TOPICAL AS NEEDED
Status: DISCONTINUED | OUTPATIENT
Start: 2022-12-30 | End: 2022-12-30 | Stop reason: SURG

## 2022-12-30 RX ORDER — SODIUM CHLORIDE, SODIUM LACTATE, POTASSIUM CHLORIDE, CALCIUM CHLORIDE 600; 310; 30; 20 MG/100ML; MG/100ML; MG/100ML; MG/100ML
1000 INJECTION, SOLUTION INTRAVENOUS CONTINUOUS
Status: DISCONTINUED | OUTPATIENT
Start: 2022-12-30 | End: 2022-12-30 | Stop reason: HOSPADM

## 2022-12-30 RX ADMIN — DEXAMETHASONE SODIUM PHOSPHATE 4 MG: 4 INJECTION INTRA-ARTICULAR; INTRALESIONAL; INTRAMUSCULAR; INTRAVENOUS; SOFT TISSUE at 08:00

## 2022-12-30 RX ADMIN — Medication 3 MG: at 10:43

## 2022-12-30 RX ADMIN — LIDOCAINE HYDROCHLORIDE 1 EACH: 40 SOLUTION TOPICAL at 09:15

## 2022-12-30 RX ADMIN — SCOPALAMINE 1 PATCH: 1 PATCH, EXTENDED RELEASE TRANSDERMAL at 08:00

## 2022-12-30 RX ADMIN — MIDAZOLAM HYDROCHLORIDE 2 MG: 1 INJECTION, SOLUTION INTRAMUSCULAR; INTRAVENOUS at 09:07

## 2022-12-30 RX ADMIN — PROPOFOL 200 MG: 10 INJECTION, EMULSION INTRAVENOUS at 09:12

## 2022-12-30 RX ADMIN — ONDANSETRON 4 MG: 2 INJECTION INTRAMUSCULAR; INTRAVENOUS at 10:43

## 2022-12-30 RX ADMIN — ROCURONIUM BROMIDE 40 MG: 10 SOLUTION INTRAVENOUS at 09:12

## 2022-12-30 RX ADMIN — GLYCOPYRROLATE 0.4 MG: 0.2 INJECTION INTRAMUSCULAR; INTRAVENOUS at 10:43

## 2022-12-30 RX ADMIN — FENTANYL CITRATE 100 MCG: 50 INJECTION INTRAMUSCULAR; INTRAVENOUS at 09:11

## 2022-12-30 RX ADMIN — LIDOCAINE HYDROCHLORIDE 40 MG: 20 INJECTION, SOLUTION EPIDURAL; INFILTRATION; INTRACAUDAL; PERINEURAL at 09:12

## 2022-12-30 RX ADMIN — ACETAMINOPHEN 650 MG: 325 TABLET, FILM COATED ORAL at 12:31

## 2022-12-30 RX ADMIN — SODIUM CHLORIDE, POTASSIUM CHLORIDE, SODIUM LACTATE AND CALCIUM CHLORIDE 1000 ML: 600; 310; 30; 20 INJECTION, SOLUTION INTRAVENOUS at 07:17

## 2022-12-30 NOTE — ANESTHESIA PROCEDURE NOTES
Airway  Urgency: elective    Date/Time: 12/30/2022 9:15 AM  Airway not difficult    General Information and Staff    Patient location during procedure: OR  CRNA/CAA: Cherie Alexandra CRNA    Indications and Patient Condition  Indications for airway management: airway protection    Preoxygenated: yes  Mask difficulty assessment: 1 - vent by mask    Final Airway Details  Final airway type: endotracheal airway      Successful airway: ETT  Cuffed: yes   Successful intubation technique: direct laryngoscopy  Endotracheal tube insertion site: oral  Blade: Hernandez  Blade size: 2  ETT size (mm): 7.0  Cormack-Lehane Classification: grade I - full view of glottis  Placement verified by: capnometry   Cuff volume (mL): 5  Measured from: teeth  ETT/EBT  to teeth (cm): 23  Number of attempts at approach: 1  Assessment: lips, teeth, and gum same as pre-op and atraumatic intubation

## 2022-12-30 NOTE — ANESTHESIA POSTPROCEDURE EVALUATION
Patient: Deborah Barbosa    Procedure Summary     Date: 12/30/22 Room / Location: [facility] OR 03 /  [facility] OR    Anesthesia Start: 0910 Anesthesia Stop: 1052    Procedure: Endoscopic type I tympanoplasty with tragal perichondrial grafting and use of the operative microscope (Right: Ear) Diagnosis:       Perforation of right tympanic membrane      Mixed conductive and sensorineural hearing loss of both ears      Tympanosclerosis of both ears      (Perforation of right tympanic membrane [H72.91])      (Mixed conductive and sensorineural hearing loss of both ears [H90.6])      (Tympanosclerosis of both ears [H74.03])    Surgeons: Tung Toro MD Provider: Cherie Alexandra CRNA    Anesthesia Type: general ASA Status: 3          Anesthesia Type: general    Vitals  Vitals Value Taken Time   /79 12/30/22 1128   Temp 97.8 °F (36.6 °C) 12/30/22 1128   Pulse 85 12/30/22 1130   Resp 12 12/30/22 1128   SpO2 95 % 12/30/22 1130   Vitals shown include unvalidated device data.        Post Anesthesia Care and Evaluation    Patient location during evaluation: PACU  Patient participation: complete - patient participated  Level of consciousness: awake and alert  Pain management: adequate    Airway patency: patent  Anesthetic complications: No anesthetic complications  PONV Status: none  Cardiovascular status: acceptable and hemodynamically stable  Respiratory status: acceptable  Hydration status: acceptable    Comments: Blood pressure 143/96, pulse 89, temperature 97.8 °F (36.6 °C), temperature source Temporal, resp. rate 20, last menstrual period 12/28/2022, SpO2 96 %, not currently breastfeeding.    Patient discharged from PACU based upon Ellen score. Please see RN notes for further details

## 2022-12-30 NOTE — OP NOTE
Tung Toro MD   Operative Note    Deborah Barbosa  12/30/2022    Pre-op Diagnosis:   Perforation of right tympanic membrane [H72.91]  Mixed conductive and sensorineural hearing loss of both ears [H90.6]  Tympanosclerosis of both ears [H74.03]    Post-op Diagnosis:     Post-Op Diagnosis Codes:     * Perforation of right tympanic membrane [H72.91]     * Mixed conductive and sensorineural hearing loss of both ears [H90.6]     * Tympanosclerosis of both ears [H74.03]    Procedure/CPT® Codes:  AK TYMPANOPLASTY [01897]  AK MICROSURG TECHNIQUES,REQ OPER MICROSCOPE [20747]  AK DERMA-FAT-FASCIA GRAFT [48322]    Procedure(s):  TYMPANOPLASTY    Surgeon(s):  Tung Toro MD    Anesthesia: General    Staff:   Circulator: Roly Whelan RN; Angela Alexander RN  Scrub Person: Deirdre Angeles    Estimated Blood Loss:   minimal    Specimens:                Specimens     ID Source Type Tests Collected By Collected At Frozen?    1 Specimen Not Sent Specimen Not Sent · SPECIMEN NOT SENT   Tung Toro MD 12/30/22 1045     Description: Nothing to send per surgeon    This specimen was not marked as sent.            Drains:   none    Findings:   There is a complicated perforation with a larger perforation in the inferior quadrant and a secondary perforation more in the mid posterior quadrant just behind the umbo.    Complications:   none    Reason for the Operation:  Deborah Barbosa is a 22 y.o. female with a history of tympanic membrane perforation.  She is status post an attempted tympanoplasty by Dr. Matute which is failed.  He referred her to me for consideration of an endoscopic revision tympanoplasty.  After understanding the risks, benefits and alternatives, a consent for the operation was given.     Procedure Description:  The patient was taken back to the operating room, placed supine on the operating table and placed under anesthesia by the anesthesia staff. Once this was done a time out was  performed to confirm the patient and the proper procedure. Once this was done, the table was turned 180° to facilitate acess the head.  The right ear was prepped and draped in the usual otologic fashion.  Mastisol and 10 x 10's were placed around the ear to protect the hair from the wound.  The areas were injected with 1% lidocaine with 1 100,000 parts adrenaline.  The patient was then prepped and draped in the standard fashion.  First the microscope was used to visualize the external auditory canal and vascular strip injections were performed.   Once this was done, I used the 0° telescope to visualize the eardrum.   I rimmed the perforation with a Tee pick and a cup forceps.  A tragal perichondrial graft was harvested from the posterior aspect of the cartilage making an incision at the tip of the tragus.  The perichondrium was lifted up off of the posterior aspect of the tragal cartilage with a gimmick.  This was harvested with the scissors.  The wound was closed with 5-0 fast absorbing plain gut suture.  The graft was placed on the Tobin block for drying. Using the telescope, a tympanomeatal flap was created approximately 3-4 mm distal to the annulus.  It was lifted to the annulus and then the annulus was lifted up with a gimmick.  I took  the eardrum up off of the malleus.  Once this was elevated I used 1-10,000 parts adrenaline-soaked Gelfoam for hemostasis.  I then packed the middle ear space with MeroGel soaked with saline.  I placed the graft in the middle ear space and tucked in underneath the perforation.  I then returned the tympanomeatal flap to the normal position and packed on the outer surface of the eardrum with MeroGel with saline.  Bacitracin ointment was placed in the external auditory canal. A Baljinder dressing was placed.  The patient was then turned over to the anesthesia team and allowed to wake from anesthesia. The patient was transported to the recovery room in a stable condition.        Tung Toro MD     Date: 12/30/2022  Time: 10:49 CST

## 2022-12-30 NOTE — ANESTHESIA PREPROCEDURE EVALUATION
Anesthesia Evaluation     Patient summary reviewed   NPO Solid Status: > 8 hours             Airway   Mallampati: II  Dental      Pulmonary    (+) asthma,  (-) COPD, sleep apnea, not a smoker  Cardiovascular   Exercise tolerance: excellent (>7 METS)    (+) hypertension,   (-) pacemaker, past MI, angina, cardiac stents      Neuro/Psych  (-) seizures, TIA, CVA  GI/Hepatic/Renal/Endo    (+) morbid obesity,    (-) GERD, liver disease, no renal disease, diabetes    Musculoskeletal     Abdominal    Substance History      OB/GYN    (-)  Pregnant        Other                        Anesthesia Plan    ASA 3     general     intravenous induction     Anesthetic plan, risks, benefits, and alternatives have been provided, discussed and informed consent has been obtained with: patient.        CODE STATUS:

## 2022-12-30 NOTE — DISCHARGE INSTRUCTIONS
Tympanoplasty with or without Mastoidectomy: At Home Instructions    The following instructions will help you to know what to expect in the days following surgery. Do not hesitate to call if you have questions or concerns.    Activities  There are some limitations on activity until the incision behind the ear heals. You should limit activities for the first 7 to 10 days after surgery, avoiding exertion or active play. Thereafter, a slow increase in activity is recommended. There is to be no gym, recess, swimming, sports, heavy exercising, or playing musical instruments that require blowing (trumpet, clarinet, trombone, saxophone, etc) for at least 1 month after surgery.  You may return to school/ work when sleeping well, no longer on pain medication and able to eat a regular diet.  Try to avoid straining and nose blowing. Also, it is important to sneeze with the mouth open for 10-14 days after surgery.    Diet  Unrestricted. Resume normal diet as tolerated.    Fever  A low-grade fever (101 degrees or less) following surgery may occur and should be treated with acetaminophen. Follow the directions on the bottle. If the fever persists (more than 2 days) or is greater than 102 degrees, call our office.    Pain  Most patients have mild to moderate pain for a few days after surgery. Acetaminophen (Tylenol®) and ibuprofen (Advil®, Motrin®) should be used to relieve any discomfort.    Pain Control  Alternate the use of acetaminophen (Tylenol®) and ibuprofen (Advil®, Motrin®) every 3 hours to control your pain. Please follow this medication schedule while the patient is awake for the first few days after surgery.  Older children and adults may receive a prescription for a stronger pain medication. Please use this medication if acetaminophen and ibuprofen are not controlling your child’s pain.  Give pain medication on a regular schedule for the first 2-3 days after surgery.  Rectal acetaminophen suppositories and orally  disintegrating tablets are options for children refusing pain medication orally. Available over-the-counter.  Your physician will instruct you if ibuprofen is not appropriate to use.    Nausea and Vomiting  Nausea and vomiting is common for children having this type of ear surgery and anesthesia. Your physician may prescribe a medication for nausea/vomiting which can be used to help with these symptoms. The patient should experience relief within a few hours. Please call the office if it does not stop within 6-8 hours.    Hearing Loss:  Hearing loss is usually temporary and related to absorbable packing and healing fluids from the surgery. If surgery was done to improve hearing there may not be a noticeable change until 8-12 weeks after surgery.    Tinnitus and Vertigo  It is not uncommon to have temporary noises (tinnitus) in the ear until it heals. This is usually temporary. Vertigo may also be present for the first few days after surgery. If severe and persists more than 3-5 days, call the office.    Drainage:  It is expected to have drainage from the ear canal for up to a 7-10 days after surgery. Often times this can be bloody, especially the first few days after surgery. This will usually resolve on its own, but sometimes medicated ear drops will be used to treat the drainage if it is excessive or persists. Change the cotton ball as needed when there is drainage and call if it is soaking with blood within 15 minutes after changing.    Antibiotics  A prescription for antibiotic ear drops may be given to your child to start after surgery. Please follow the instructions if this is given to you.    Care of the ear  During the first week after surgery, there may be some blood-tinged discharge from the ear. Some bleeding immediately following surgery is not uncommon. A small cotton ball may be used at the opening of the ear as needed for drainage. There may also be some drainage from the incision behind the ear. This  is not a problem; however, if it persists, increases, becomes discolored, or a foul odor is noted, please call your physician.  Remove head dressing (Baljinder) 24 hours after surgery. This is a plastic cup with a headband. On the inside of the cup is 4x4 gauze the can be changed with fresh gauze if it gets soiled. You can wear the head dressing as desired after the first night for comfort but it is ok to not wear the dressing after 24 hours.  Apply antibiotic ointment to the incision behind the ear 2 times a day. Absorbable sutures are used to close the incision. These do not need to be removed. Steri strips/tape may be present after surgery, if so please follow your physician’s instructions to care for this incision.  Keep Ear Dry: The patient may shower and wash his/her hair 3 days after the surgery. Please use ear plugs or a cotton ball dipped in Vaseline® when showering/washing hair.  Avoid nose blowing for 2 weeks following surgery. Encourage the patient to sneeze with his/her mouth open.    Contact Information  The main office phone number is [phone]. For emergencies after hours and on weekends, this number will convert over to our answering service and the on call provider will answer. Please try to keep non emergent phone calls/ questions to office hours 9am-5pm Monday through Friday.     [employer]  As an alternative, you can sign up and use the Epic MyChart system for more direct and quicker access for non emergent questions/ problems.  [facility] [employer] allows you to send messages to your doctor, view your test results, renew your prescriptions, schedule appointments, and more.

## 2022-12-30 NOTE — H&P
[facility]   PREOPERATIVE HISTORY AND PHYSICAL    Patient Name:Deborah Barbosa  : 2000  MRN: [id number]  Primary Care Physician: Nayely Randolph APRN  Date of admission: 2022    Subjective   Subjective     Chief Complaint: preoperative evaluation    History of Present Illness  Deborah Barbosa is a 22 y.o. female who presents for preoperative evaluation. She is scheduled for TYMPANOPLASTY (Right)    Personal History     Past Medical History:   Diagnosis Date   • Anxiety    • Asthma    • HL (hearing loss)    • Hypertension    • PCOS (polycystic ovarian syndrome)        Past Surgical History:   Procedure Laterality Date   • ADENOIDECTOMY     • HAND SURGERY Right    • MYRINGOPLASTY Right 2022    Procedure: MYRINGOPLASTY, Examination under anesthesia;  Surgeon: Casey Matute Jr., MD;  Location: [facility];  Service: ENT;  Laterality: Right;   • MYRINGOTOMY W/ TUBES      at 4 years old   • TONSILLECTOMY     • TYMPANOPLASTY         Family History: Her family history is not on file.     Social History: She  reports that she has never smoked. She has never used smokeless tobacco. She reports that she does not drink alcohol and does not use drugs.    Home Medications:  Fluticasone Furoate-Vilanterol, albuterol sulfate HFA, cetirizine, escitalopram, losartan, montelukast, and multivitamin with minerals    Allergies:  She is allergic to betadine [povidone-iodine], latex, and oxycodone.    Objective    Objective     Vitals:    Temp:  [97 °F (36.1 °C)] 97 °F (36.1 °C)  Heart Rate:  [78-85] 85  Resp:  [16] 16  BP: (121)/(95) 121/95    ENT Physical Exam  Constitutional  Appearance: patient appears well-developed and well-nourished,  Communication/Voice: communication appropriate for developmental age; vocal quality normal;  Head and Face  Appearance: head appears normal, face appears normal and face appears atraumatic;  Palpation: facial palpation normal;  Salivary: glands normal;  Ear  Hearing:  intact;  Auricles: right auricle normal; left auricle normal;  External Mastoids: right external mastoid normal; left external mastoid normal;  Ear comments: Both canals have no cerumen and are normal.  The right eardrum has about a 50 to 55% perforation that is central. It is in the lower aspect of the eardrum, more in the posterior quadrant.   The left tympanic membrane has a lot of myringosclerosis, but I do not see an obvious hole in the eardrum.Tuning fork examination: Lock examination goes off to the right-hand side. Bone conduction is greater than air conduction bilaterally.  Nose  External Nose: nares patent bilaterally; external nose normal;  Oral Cavity/Oropharynx  Lips: normal;  Neck  Neck: neck normal;  Respiratory  Inspection: breathing unlabored; normal breathing rate;  Cardiovascular  Inspection: extremities are warm and well perfused; no peripheral edema present;  Neurovestibular  Mental Status: alert and oriented;  Psychiatric: mood normal; affect is appropriate;        Assessment & Plan   Assessment / Plan     Brief Patient Summary:  Deborah Barbosa is a 22 y.o. female who presents for preoperative evaluation.    Pre-Op Diagnosis Codes:     * Perforation of right tympanic membrane [H72.91]     * Mixed conductive and sensorineural hearing loss of both ears [H90.6]     * Tympanosclerosis of both ears [H74.03]    Active Hospital Problems:  Active Hospital Problems    Diagnosis    • Mixed conductive and sensorineural hearing loss of both ears    • Perforation of right tympanic membrane    • Tympanosclerosis of both ears      Plan:   Procedure(s):  TYMPANOPLASTY    TYMPANOPLASTY: The risks and benefits of tympanoplasty were explained including but not limited to bleeding, infection, risks of the general anesthesia, pain, hearing loss, vertigo, aural fullness, the possibility of a post -auricular approach, possible need for mastoidectomy, persistent perforation, and nerve injury including facial (with  facial paralysis) and chorda typani (with dysguesia) nerves. Alternatives were discussed. The patient/parents demonstrated understanding of these risks. Questions were asked appropriately answered. No guarantees were made or implied.      Tung Toro MD

## 2023-01-26 ENCOUNTER — OFFICE VISIT (OUTPATIENT)
Dept: OTOLARYNGOLOGY | Facility: CLINIC | Age: 23
End: 2023-01-26
Payer: COMMERCIAL

## 2023-01-26 VITALS
TEMPERATURE: 98.2 F | HEART RATE: 89 BPM | BODY MASS INDEX: 42.65 KG/M2 | OXYGEN SATURATION: 97 % | RESPIRATION RATE: 18 BRPM | WEIGHT: 249.8 LBS | HEIGHT: 64 IN

## 2023-01-26 DIAGNOSIS — Z98.890 S/P TYMPANOPLASTY: ICD-10-CM

## 2023-01-26 DIAGNOSIS — H72.91 PERFORATION OF RIGHT TYMPANIC MEMBRANE: Primary | ICD-10-CM

## 2023-01-26 PROCEDURE — 99024 POSTOP FOLLOW-UP VISIT: CPT | Performed by: OTOLARYNGOLOGY

## 2023-01-26 NOTE — PROGRESS NOTES
Tung Toro MD   No chief complaint on file.       History of Present Illness  Deborah Barbosa is a  22 y.o.  female who presents for follow up s/p Endoscopic type I tympanoplasty with tragal perichondrial grafting and use of the operative microscope - Right   12/30/2022. The patient has had a relatively normal postoperative course and currently has no related complaints.  She reports she had a lot of sneezing after the surgery.        Vital Signs:       ENT Physical Exam   Ear exam- microscope  Right ear-there is some failure of the graft anteriorly with some retained Gelfoam posteriorly.  Her tuning fork examination lateralizes to the right.       Assessment   1. Perforation of right tympanic membrane    2. S/P tympanoplasty        Plan    Continue current management plan.  Increase activity  We will follow this perforation for the time being.  Hopefully there will be some improvement and continued closure.            Return in about 2 months (around 3/26/2023) for follow up with audiogram.    Tung Toro MD  01/26/23  12:46 CST

## 2023-03-30 ENCOUNTER — PROCEDURE VISIT (OUTPATIENT)
Dept: OTOLARYNGOLOGY | Facility: CLINIC | Age: 23
End: 2023-03-30
Payer: COMMERCIAL

## 2023-03-30 ENCOUNTER — OFFICE VISIT (OUTPATIENT)
Dept: OTOLARYNGOLOGY | Facility: CLINIC | Age: 23
End: 2023-03-30
Payer: COMMERCIAL

## 2023-03-30 VITALS — TEMPERATURE: 97.5 F

## 2023-03-30 DIAGNOSIS — Z98.890 S/P TYMPANOPLASTY: ICD-10-CM

## 2023-03-30 DIAGNOSIS — H93.11 TINNITUS OF RIGHT EAR: ICD-10-CM

## 2023-03-30 DIAGNOSIS — H72.91 PERFORATION OF RIGHT TYMPANIC MEMBRANE: Primary | ICD-10-CM

## 2023-03-30 DIAGNOSIS — H72.93 PERFORATION OF BOTH TYMPANIC MEMBRANES: ICD-10-CM

## 2023-03-30 DIAGNOSIS — H90.0 CONDUCTIVE HEARING LOSS, BILATERAL: Primary | ICD-10-CM

## 2023-03-30 PROCEDURE — 99024 POSTOP FOLLOW-UP VISIT: CPT | Performed by: EMERGENCY MEDICINE

## 2023-03-30 PROCEDURE — 92567 TYMPANOMETRY: CPT

## 2023-03-30 PROCEDURE — 92557 COMPREHENSIVE HEARING TEST: CPT

## 2023-03-30 RX ORDER — CEFDINIR 300 MG/1
CAPSULE ORAL
COMMUNITY
Start: 2023-03-29

## 2023-03-30 NOTE — PROGRESS NOTES
JAH Peters  [facility]  [address], [address]  [place] [address]  Fax [phone]  Phone [phone]      Visit Type: FOLLOW UP   Chief Complaint   Patient presents with   • Ear Problem        HPI  Deborah Barbosa is a 22 y.o.  female who presents for follow up s/p Endoscopic type I tympanoplasty with tragal perichondrial grafting and use of the operative microscope - Right on 12/30/2022. The patient has had a relatively normal postoperative course and currently has no related complaints.    Past Medical History:   Diagnosis Date   • Anxiety    • Asthma    • HL (hearing loss)    • Hypertension    • PCOS (polycystic ovarian syndrome)        Past Surgical History:   Procedure Laterality Date   • ADENOIDECTOMY     • HAND SURGERY Right    • MYRINGOPLASTY Right 5/13/2022    Procedure: MYRINGOPLASTY, Examination under anesthesia;  Surgeon: Casey Matute Jr., MD;  Location: [facility];  Service: ENT;  Laterality: Right;   • MYRINGOTOMY W/ TUBES      at 4 years old   • TONSILLECTOMY     • TYMPANOPLASTY     • TYMPANOPLASTY Right 12/30/2022    Procedure: Endoscopic type I tympanoplasty with tragal perichondrial grafting and use of the operative microscope;  Surgeon: Tung Toro MD;  Location: [facility];  Service: ENT;  Laterality: Right;       Family History: Her family history is not on file.     Social History: She  reports that she has never smoked. She has never used smokeless tobacco. She reports that she does not drink alcohol and does not use drugs.    Home Medications:  Fluticasone Furoate-Vilanterol, albuterol sulfate HFA, cefdinir, cetirizine, escitalopram, losartan, montelukast, and multivitamin with minerals    Allergies:  She is allergic to betadine [povidone-iodine], latex, and oxycodone.       Vital Signs:   Temp:  [97.5 °F (36.4 °C)] 97.5 °F (36.4 °C)  ENT Physical Exam  Ear  Hearing:  intact;  Auricles: right auricle normal; left auricle normal;  External Mastoids: right external mastoid normal; left external mastoid normal;  Ear Canals: right ear canal normal; left ear canal normal;  Tympanic Membranes: right tympanic membrane perforated; left tympanic membrane normal;         Result Review    RESULTS REVIEW    I have reviewed the patients old records in the chart.   The following results/records were reviewed:   Progress Notes by Macarena Heath AUD (03/30/2023 13:00) conductive hearing loss, worsening on right         Assessment & Plan    Diagnoses and all orders for this visit:    1. Perforation of right tympanic membrane (Primary)    2. S/P tympanoplasty  Overview:  Op Note by uTng Toro MD (12/30/2022 09:40)         Patient and family voice no desire for any further surgery or [facility] referral.  She was provided with information regarding workforce development to obtain hearing aids.    Return in about 6 months (around 9/30/2023) for Follow up with Dr. Toro.      Nellie Ramos, APRN   03/30/23  14:02 CDT

## 2023-03-30 NOTE — PROGRESS NOTES
FOLLOW-UP AUDIOMETRIC EVALUATION      Name:  Deborah Barbosa  :  2000  Age:  22 y.o.  Date of Evaluation:  2023       History:  Reason for visit:  Ms. Barbosa is seen today at the request of Tung Toro MD for a follow-up hearing evaluation. Patient has a history of bilateral conductive hearing loss, bilateral tympanic membrane perforation, bilateral tinnitus, and bilateral otalgia. She had right myringoplasty on 2022 and right type I tympanoplasty on 2022. Patient is here today with her mother. Previous audio was on 2022. She has not noticed any changes in her hearing since the previous audio.    PE Tubes:  yes, both ears, as a child  Other otologic surgical history: yes, both ears, repaired eardrums around 2022 - myringoplasty and tympanoplasty right.  Tinnitus:  yes, sometimes a ringing in right ear   Dizziness:  no  Noise Exposure: yes, guns (right-handed) with hearing protection  Aural Fullness:  yes, both ears  Otalgia: no, both ears  Family history of hearing loss: no  Other significant history: high blood pressure, asthma, PCOS  Head trauma requiring hospital stay: no  Previous brain MRI: possibly, about 4-5 years ago    EVALUATION:            RESULTS:    Otoscopic Evaluation:  Right: clear canal, tympanic membrane visualized and perforation visualized  Left: clear canal, tympanic membrane visualized, possible perforation visualized and scarring on tympanic membrane noted         Tympanometry (226 Hz):  Bilateral: Type B- large ear canal volume         Pure Tone Audiometry (via inserts with good reliability):    Right: moderate rising to mild, dropping to moderate at 3kHz conductive hearing loss, rising to normal   Left: mild rising to normal, sloping to mild conductive hearing loss         Speech Audiometry:   Right: Speech Reception Threshold (SRT) was obtained at 35 dBHL  Word Recognition scores- excellent/within normal limits (90 - 100%) using NU-6 List 3A, 25  words  Left: Speech Reception Threshold (SRT) was obtained at 25 dBHL  Word Recognition scores- excellent/within normal limits (90 - 100%) using NU-6 List 3A, 25 words      IMPRESSIONS:  Tympanometry showed a large ear canal volume, consistent with a tympanic membrane perforation or a patent PE tube, for both ears. Pure tone thresholds for the right ear show a moderate conductive hearing loss rising to normal, with a moderate loss at 3kHz, suggesting abnormal outer/middle ear function and normal cochlear/retrocochlear function. Pure tone thresholds for the left ear show a mild rising to normal, sloping to mild conductive hearing loss, suggesting abnormal outer/middle ear function and normal cochlear/retrocochlear function. Right ear air pure tone thresholds declined compared to previous audio in 2022. Patient and mother were counseled with regard to the findings.    Amplification needs:  Patient could benefit from hearing aids. Patient's word recognition scores were excellent.    Diagnosis:   1. Conductive hearing loss, bilateral    2. Tinnitus of right ear    3. Perforation of both tympanic membranes    4. S/P tympanoplasty        RECOMMENDATIONS/PLAN:  Follow-up recommendations per JAH Dotson    Audiologic follow-up in 1 year  Return for audiologic testing if noticing changes in hearing or concerns arise  Hearing aid evaluation and counseling upon medical clearance and patient motivation  Use communication strategies  Use hearing protection around loud noises  Avoid silence when possible. Sleep with white noise/fan, or listen to nature sounds     EDUCATION:  Discussed results and recommendations with patient. Questions were addressed and the patient was encouraged to contact our department should concerns arise.      Josh Ortega [facility]  Licensed Audiologist

## 2023-04-18 ENCOUNTER — TELEPHONE (OUTPATIENT)
Dept: OTOLARYNGOLOGY | Facility: CLINIC | Age: 23
End: 2023-04-18
Payer: COMMERCIAL

## 2023-04-18 NOTE — TELEPHONE ENCOUNTER
Patient called stating she needs to submit a letter to vocational rehabilitation stating that she needs hearing aids and that it needs to include the color and cost.     Josh Cotto CCC-A  Licensed Audiologist

## 2023-04-18 NOTE — TELEPHONE ENCOUNTER
Spoke with patient to obtain contact information for the person at [facility] and asked if the letter needed to be from an audiologist or physician. She provided me with the email [email or web address] and the phone numbers [phone] (office) and [phone] (cell). I informed her that I will reach out to them and see what we can do. Since we don't do hearing aids here, I am unsure if I can provide the cost of hearing aids etc.    Josh Cotto [facility]-A  Licensed Audiologist

## 2023-04-25 ENCOUNTER — TELEPHONE (OUTPATIENT)
Dept: OTOLARYNGOLOGY | Facility: CLINIC | Age: 23
End: 2023-04-25
Payer: COMMERCIAL

## 2023-04-25 NOTE — TELEPHONE ENCOUNTER
Spoke with patient and informed her that I reached out to Adenike Mas regarding the paperwork necessary to obtain hearing aids. Since we do not do hearing aids, Adenike said she would reach out to the patient for a hearing aid consultation and requested a copy of her hearing evaluation for their records. I asked patient if she would like us to fax the results over and she confirmed and gave me verbal permission. Patient will call if she has any other questions.    Josh Cotto [facility]-A  Licensed Audiologist

## 2023-09-07 ENCOUNTER — TELEPHONE (OUTPATIENT)
Dept: OTOLARYNGOLOGY | Facility: CLINIC | Age: 23
End: 2023-09-07
Payer: COMMERCIAL

## 2023-09-07 NOTE — TELEPHONE ENCOUNTER
Called pt to discuss 10/2 appt. To see if she is ok with see APRN, or if she has considered hearing aids/university surgery. Can r/s with resser if wanted, because he will be out of office 10/2. LVM requesting call back

## (undated) DEVICE — ADHS LIQ MASTISOL 2/3ML

## (undated) DEVICE — TUBING, SUCTION, 1/4" X 12', STRAIGHT: Brand: MEDLINE

## (undated) DEVICE — PACK,SET UP,NO DRAPES: Brand: MEDLINE

## (undated) DEVICE — PREP SOL POVIDONE/IODINE BT 4OZ

## (undated) DEVICE — ELECTRD BLD EZ CLN MOD XLNG 2.75IN

## (undated) DEVICE — PK TURNOVER RM ADV

## (undated) DEVICE — DRN PENRS RO SILCNE 1/4X12IN LF STRL

## (undated) DEVICE — NDL HYPO PRECISIONGLIDE/REG 18G 11/2 PNK

## (undated) DEVICE — TOWEL,OR,DSP,ST,BLUE,STD,4/PK,20PK/CS: Brand: MEDLINE

## (undated) DEVICE — PK ENT HD AND NK 30

## (undated) DEVICE — 1010 S-DRAPE TOWEL DRAPE 10/BX: Brand: STERI-DRAPE™

## (undated) DEVICE — 4-PORT MANIFOLD: Brand: NEPTUNE 2

## (undated) DEVICE — BAPTIST TURNOVER KIT: Brand: MEDLINE INDUSTRIES, INC.

## (undated) DEVICE — WIPE INST 3X3IN 2MM BX/20

## (undated) DEVICE — MICRO KOVER: Brand: UNBRANDED

## (undated) DEVICE — KT ANTI FOG W/FLD AND SPNG

## (undated) DEVICE — SOL IRR BSS 15ML STRL

## (undated) DEVICE — CATH IV ANGIOCATH FEP 14GA 1.88IN ORNG

## (undated) DEVICE — STRIP CLS WND CURAD MEDI/STRIP HYPOALLERG 0.25X4IN PK/10

## (undated) DEVICE — TRAP FLD MINIVAC MEGADYNE 100ML

## (undated) DEVICE — GLV SURG BIOGEL M LTX PF 7 1/2

## (undated) DEVICE — SYR PRECISIONGLIDE LL 5CC 20X1 1/2IN

## (undated) DEVICE — 3M™ STERI-DRAPE™ MEDIUM DRAPE WITH APERTURE 1030: Brand: STERI-DRAPE™

## (undated) DEVICE — SYR LL TP 10ML STRL

## (undated) DEVICE — SURGICAL SUCTION CONNECTING TUBE WITH MALE CONNECTOR AND SUCTION CLAMP, 2 FT. LONG (.6 M), 5 MM I.D.: Brand: CONMED

## (undated) DEVICE — HDRST POSITIONING FM RND 2X9IN

## (undated) DEVICE — CABL BIPOL MEGADYNE 12FT DISP

## (undated) DEVICE — SUT VIC 4/0 P3 18IN UD VCP494H

## (undated) DEVICE — SUT GUT PLN FAST ABS 5/0 PC1 18IN 1915G